# Patient Record
Sex: FEMALE | Race: AMERICAN INDIAN OR ALASKA NATIVE | NOT HISPANIC OR LATINO | Employment: FULL TIME | URBAN - METROPOLITAN AREA
[De-identification: names, ages, dates, MRNs, and addresses within clinical notes are randomized per-mention and may not be internally consistent; named-entity substitution may affect disease eponyms.]

---

## 2017-05-29 ENCOUNTER — OFFICE VISIT (OUTPATIENT)
Dept: INTERNAL MEDICINE | Facility: CLINIC | Age: 47
End: 2017-05-29
Payer: COMMERCIAL

## 2017-05-29 ENCOUNTER — HOSPITAL ENCOUNTER (OUTPATIENT)
Dept: RADIOLOGY | Facility: HOSPITAL | Age: 47
Discharge: HOME | End: 2017-05-29
Attending: INTERNAL MEDICINE
Payer: COMMERCIAL

## 2017-05-29 VITALS
SYSTOLIC BLOOD PRESSURE: 131 MMHG | WEIGHT: 184.06 LBS | BODY MASS INDEX: 31.42 KG/M2 | DIASTOLIC BLOOD PRESSURE: 85 MMHG | HEART RATE: 101 BPM | HEIGHT: 64 IN | RESPIRATION RATE: 17 BRPM | TEMPERATURE: 98 F

## 2017-05-29 DIAGNOSIS — J20.9 ACUTE BRONCHITIS, UNSPECIFIED ORGANISM: ICD-10-CM

## 2017-05-29 DIAGNOSIS — J20.9 ACUTE BRONCHITIS, UNSPECIFIED ORGANISM: Primary | ICD-10-CM

## 2017-05-29 DIAGNOSIS — I10 BENIGN ESSENTIAL HYPERTENSION: ICD-10-CM

## 2017-05-29 PROCEDURE — 71020 XR CHEST PA AND LATERAL: CPT | Mod: 26,,, | Performed by: RADIOLOGY

## 2017-05-29 PROCEDURE — 99999 PR PBB SHADOW E&M-NEW PATIENT-LVL IV: CPT | Mod: PBBFAC,,, | Performed by: INTERNAL MEDICINE

## 2017-05-29 PROCEDURE — 71020 XR CHEST PA AND LATERAL: CPT | Mod: TC

## 2017-05-29 PROCEDURE — 99203 OFFICE O/P NEW LOW 30 MIN: CPT | Mod: S$GLB,,, | Performed by: INTERNAL MEDICINE

## 2017-05-29 RX ORDER — AZELASTINE 1 MG/ML
SPRAY, METERED NASAL
Refills: 5 | COMMUNITY
Start: 2017-04-13

## 2017-05-29 RX ORDER — METOPROLOL SUCCINATE 25 MG/1
12.5 TABLET, EXTENDED RELEASE ORAL 2 TIMES DAILY
Refills: 8 | COMMUNITY
Start: 2017-04-04 | End: 2017-05-29

## 2017-05-29 RX ORDER — DROSPIRENONE AND ETHINYL ESTRADIOL 0.02-3(28)
1 KIT ORAL DAILY
Refills: 11 | COMMUNITY
Start: 2017-04-28

## 2017-05-29 RX ORDER — AZITHROMYCIN 250 MG/1
TABLET, FILM COATED ORAL
COMMUNITY
Start: 2017-05-25

## 2017-05-29 RX ORDER — ALBUTEROL SULFATE 90 UG/1
1-2 AEROSOL, METERED RESPIRATORY (INHALATION) EVERY 6 HOURS PRN
Qty: 18 G | Refills: 0 | Status: SHIPPED | OUTPATIENT
Start: 2017-05-29 | End: 2018-05-29

## 2017-05-29 RX ORDER — ATORVASTATIN CALCIUM 40 MG/1
40 TABLET, FILM COATED ORAL DAILY
Refills: 0 | COMMUNITY
Start: 2017-05-02

## 2017-05-29 RX ORDER — ACETAMINOPHEN 500 MG
500 TABLET ORAL EVERY 6 HOURS PRN
COMMUNITY

## 2017-05-29 RX ORDER — BENZONATATE 100 MG/1
100 CAPSULE ORAL 3 TIMES DAILY PRN
Qty: 30 CAPSULE | Refills: 0 | Status: SHIPPED | OUTPATIENT
Start: 2017-05-29 | End: 2017-06-08

## 2017-05-29 RX ORDER — FLUTICASONE PROPIONATE 50 MCG
SPRAY, SUSPENSION (ML) NASAL
Refills: 5 | COMMUNITY
Start: 2017-04-13

## 2017-05-29 RX ORDER — PROMETHAZINE HYDROCHLORIDE AND DEXTROMETHORPHAN HYDROBROMIDE 6.25; 15 MG/5ML; MG/5ML
5 SYRUP ORAL NIGHTLY PRN
Qty: 180 ML | Refills: 0 | Status: SHIPPED | OUTPATIENT
Start: 2017-05-29 | End: 2017-06-08

## 2017-05-29 RX ORDER — METOPROLOL TARTRATE 25 MG/1
12.5 TABLET, FILM COATED ORAL 2 TIMES DAILY
Qty: 30 TABLET | Refills: 1 | Status: SHIPPED | OUTPATIENT
Start: 2017-05-29 | End: 2018-05-29

## 2017-05-29 RX ORDER — PROMETHAZINE HYDROCHLORIDE AND DEXTROMETHORPHAN HYDROBROMIDE 6.25; 15 MG/5ML; MG/5ML
5 SYRUP ORAL NIGHTLY PRN
Qty: 180 ML | Refills: 0 | Status: SHIPPED | OUTPATIENT
Start: 2017-05-29 | End: 2017-05-29 | Stop reason: SDUPTHER

## 2017-05-29 RX ORDER — GUAIFENESIN 600 MG/1
1200 TABLET, EXTENDED RELEASE ORAL 2 TIMES DAILY
COMMUNITY

## 2017-05-29 NOTE — PROGRESS NOTES
"Subjective:       Patient ID: Arabella Jaimes is a 47 y.o. female.    Chief Complaint: URI; Cough; and Sore Throat    HPI urgent, Dr. Whyte    Reports chronic allergies. In the past 3 weeks, worsening symptoms. 5 days ago, had chills, body aches, sore throat, subjective fevers. Took tylenol, which helped a little bit. Saw outside MD (Dr. Eliseo Villalta) the next day. Given steroid injection(?) and Z pack. Felt worse (cough and chest tightness) after the visit. More painful sore throat. Productive cough w/ green sputum. Difficulty swallowing - felt like her throat feels "swollen"; feels like food is stuck in the throat when she swallows but no regurgitation of food and no actually stuck in the throat. Similar to her previous strep throat. Also some pain behind the R ear (has this w/ regular allergies). C/o chest tightness - feels like she can't breathe well; however no WEINSTEIN when walking. No rhinorrhea/congestion (not worse than baseline). No wheezing.     Taking tylenol prn for sore throat (from 6 of 10 to 4-5 of 10 for sore throat).   Takes zyrtec D BID - had to stop due to elevated BP. Currently on zyrtec (regular). Always takes flonase and astelin from her allergist.     Diarrhea x 1 ep today.    Works in school admin. No one in immediate family is sick.     HTN - on metoprolol XL 12.5mg daily.     Review of Systems  as above in HPI.     Objective:      Physical Exam    /85   Pulse 101   Temp 98.1 °F (36.7 °C) (Oral)   Resp 17   Ht 5' 4" (1.626 m)   Wt 83.5 kg (184 lb 1.4 oz)   LMP 05/08/2017   BMI 31.60 kg/m²     Gen - A+OX4, NAD  HEENT - PERRL, OP mild erythema but not edema. TM normal.   Neck - no LAD  CV - RRR, no m/r  Chest - CTAB, no wheezing/rhonchi/crackles. Normal work of breathing.  Abd - S/NT/ND/+BS  Ext -2 + B DP and radial pulses. No LE edema.     Assessment/Plan     Arabella was seen today for uri, cough and sore throat.    Diagnoses and all orders for this visit:    Acute bronchitis, " unspecified organism  -     X-Ray Chest PA And Lateral; Future  -     albuterol 90 mcg/actuation inhaler; Inhale 1-2 puffs into the lungs every 6 (six) hours as needed for Wheezing or Shortness of Breath. Rescue  -     benzonatate (TESSALON) 100 MG capsule; Take 1 capsule (100 mg total) by mouth 3 (three) times daily as needed.  -     promethazine-dextromethorphan (PROMETHAZINE-DM) 6.25-15 mg/5 mL Syrp; Take 5 mLs by mouth nightly as needed.    Benign essential hypertension - pt is on toprol XL 1/2 tab bid. Discussed that Toprol XL is an extended release medicine and cannot be cut. Will change to lopressor 25mg 1/2 tab. Pt to let her PCP know.  -     metoprolol tartrate (LOPRESSOR) 25 MG tablet; Take 0.5 tablets (12.5 mg total) by mouth 2 (two) times daily.    Return if symptoms worsen or fail to improve.      Rosey Lobo MD  Department of Internal Medicine - [facility]  2:23 PM    Pharmacy reports that pharmacy does not have the rx for promethazine-DM. Try OTC cough medicines - robitussin or delsym DM.     Rosey Lobo MD  Department of Internal Medicine - [facility]  3:18 PM

## 2017-08-02 DIAGNOSIS — I10 BENIGN ESSENTIAL HYPERTENSION: ICD-10-CM

## 2017-08-02 RX ORDER — METOPROLOL TARTRATE 25 MG/1
12.5 TABLET, FILM COATED ORAL 2 TIMES DAILY
Qty: 30 TABLET | Refills: 1 | OUTPATIENT
Start: 2017-08-02 | End: 2018-08-02

## 2017-08-26 DIAGNOSIS — I10 BENIGN ESSENTIAL HYPERTENSION: ICD-10-CM

## 2017-08-26 RX ORDER — METOPROLOL TARTRATE 25 MG/1
12.5 TABLET, FILM COATED ORAL 2 TIMES DAILY
Qty: 30 TABLET | Refills: 1 | OUTPATIENT
Start: 2017-08-26 | End: 2018-08-26

## 2020-05-11 ENCOUNTER — LAB VISIT (OUTPATIENT)
Dept: PRIMARY CARE CLINIC | Facility: CLINIC | Age: 50
End: 2020-05-11
Payer: COMMERCIAL

## 2020-05-11 DIAGNOSIS — Z00.6 RESEARCH STUDY PATIENT: Primary | ICD-10-CM

## 2020-05-11 LAB — SARS-COV-2 IGG SERPLBLD QL IA.RAPID: NEGATIVE

## 2020-05-11 PROCEDURE — 86769 SARS-COV-2 COVID-19 ANTIBODY: CPT

## 2020-05-11 PROCEDURE — U0002 COVID-19 LAB TEST NON-CDC: HCPCS

## 2020-05-11 NOTE — RESEARCH
Date of Consent: [id number]    Sponsor: [facility]    Study Title/IRB Number: Observational study of Sars-CoV2 Immunoglobulin G (IgG) seroprevalence among the [facility] population over time 2020.163  Principle Investigator: Nicole Pereyra, PhD    Did the patient need translation services? No   name: N/A    Prior to the Informed Consent (IC) being signed, or any study protocol required data collection, testing, procedure, or intervention being performed, the following was done and/or discussed:   Patient was given a paper copy of the IC for review    Patient was given study FAQ   Purpose of the study and qualifications to participate    Study design and tests or procedures done at this visit   Confidentiality and HIPAA Authorization for Release of Medical Records for the research trial/ subject's rights/research related injury   Risk, Benefits, Alternative Treatments, Compensation and Costs   Participation in the research trial is voluntary and patient may withdraw at anytime   Contact information for study related questions    Patient verbalizes understanding of the above: Yes  Contact information for PI and IRB given to patient: Yes  Patient able to adequately summarize: the purpose of the study, the risks associated with the study, and all procedures, testing, and follow-ups associated with the study: Yes    The consent was discussed verbally with the patient and all questions were answered satisfactorily. Patient gave verbal consent for the Seroprevalence research study with an IRB approval date of 05/08/2020.      The Consent, Consent Witness and name of Clinical Research Coordinator consenting was captured and documented in [employer]Cap.    All Inclusion and Exclusion Criteria reviewed, subject meets all Inclusion criteria and does not meet any Exclusion Criteria at this time.     Patient Eligibility was confirmed.    Patient responded to survey questions.    The following biospecimen  collection procedures were collected:    -Nasopharyngeal Swab Collection  -Blood collection

## 2020-05-12 LAB — SARS-COV-2 RNA RESP QL NAA+PROBE: NOT DETECTED

## 2021-04-16 ENCOUNTER — PATIENT MESSAGE (OUTPATIENT)
Dept: RESEARCH | Facility: HOSPITAL | Age: 51
End: 2021-04-16

## 2023-12-01 ENCOUNTER — TELEPHONE (OUTPATIENT)
Dept: SURGERY | Facility: CLINIC | Age: 53
End: 2023-12-01
Payer: COMMERCIAL

## 2023-12-01 NOTE — TELEPHONE ENCOUNTER
Spoke with pt regarding referral request. Pt request was sent to Dr. Cole but patient is to be seen by Dr. Rodríguez per referral from Dr. Marcelo Yao at [facility] for inflammation and autoimmune issues. Message sent to staff to schedule patient. Pt denies further questions at this time.       ----- Message from Maribeth Limon sent at 12/1/2023  9:04 AM CST -----  Regarding: pt adivce  Contact: [phone]  Pt calling in regards to scheduling with provider as a new pt from a referral pt needing a call back.

## 2024-05-29 ENCOUNTER — HOSPITAL ENCOUNTER (OUTPATIENT)
Dept: RADIOLOGY | Facility: HOSPITAL | Age: 54
Discharge: HOME | End: 2024-05-29
Attending: INTERNAL MEDICINE
Payer: COMMERCIAL

## 2024-05-29 ENCOUNTER — OFFICE VISIT (OUTPATIENT)
Dept: RHEUMATOLOGY | Facility: CLINIC | Age: 54
End: 2024-05-29
Payer: COMMERCIAL

## 2024-05-29 VITALS
BODY MASS INDEX: 32.28 KG/M2 | SYSTOLIC BLOOD PRESSURE: 148 MMHG | HEART RATE: 74 BPM | DIASTOLIC BLOOD PRESSURE: 89 MMHG | WEIGHT: 188.06 LBS

## 2024-05-29 DIAGNOSIS — M25.50 POLYARTHRALGIA: ICD-10-CM

## 2024-05-29 DIAGNOSIS — M72.2 PLANTAR FASCIITIS OF LEFT FOOT: Primary | ICD-10-CM

## 2024-05-29 PROCEDURE — 1159F MED LIST DOCD IN RCRD: CPT | Mod: CPTII,S$GLB,, | Performed by: INTERNAL MEDICINE

## 2024-05-29 PROCEDURE — 73110 X-RAY EXAM OF WRIST: CPT | Mod: 26,50,, | Performed by: RADIOLOGY

## 2024-05-29 PROCEDURE — 73610 X-RAY EXAM OF ANKLE: CPT | Mod: 26,50,, | Performed by: RADIOLOGY

## 2024-05-29 PROCEDURE — 73630 X-RAY EXAM OF FOOT: CPT | Mod: TC,50

## 2024-05-29 PROCEDURE — 3079F DIAST BP 80-89 MM HG: CPT | Mod: CPTII,S$GLB,, | Performed by: INTERNAL MEDICINE

## 2024-05-29 PROCEDURE — 99999 PR PBB SHADOW E&M-EST. PATIENT-LVL V: CPT | Mod: PBBFAC,,, | Performed by: INTERNAL MEDICINE

## 2024-05-29 PROCEDURE — 1160F RVW MEDS BY RX/DR IN RCRD: CPT | Mod: CPTII,S$GLB,, | Performed by: INTERNAL MEDICINE

## 2024-05-29 PROCEDURE — 3077F SYST BP >= 140 MM HG: CPT | Mod: CPTII,S$GLB,, | Performed by: INTERNAL MEDICINE

## 2024-05-29 PROCEDURE — 73110 X-RAY EXAM OF WRIST: CPT | Mod: TC,50

## 2024-05-29 PROCEDURE — 73130 X-RAY EXAM OF HAND: CPT | Mod: TC,50

## 2024-05-29 PROCEDURE — 3008F BODY MASS INDEX DOCD: CPT | Mod: CPTII,S$GLB,, | Performed by: INTERNAL MEDICINE

## 2024-05-29 PROCEDURE — 99205 OFFICE O/P NEW HI 60 MIN: CPT | Mod: S$GLB,,, | Performed by: INTERNAL MEDICINE

## 2024-05-29 PROCEDURE — 73130 X-RAY EXAM OF HAND: CPT | Mod: 26,50,, | Performed by: RADIOLOGY

## 2024-05-29 PROCEDURE — 73630 X-RAY EXAM OF FOOT: CPT | Mod: 26,50,, | Performed by: RADIOLOGY

## 2024-05-29 PROCEDURE — 73610 X-RAY EXAM OF ANKLE: CPT | Mod: TC,50

## 2024-05-29 RX ORDER — DICLOFENAC SODIUM 10 MG/G
2 GEL TOPICAL 2 TIMES DAILY PRN
Qty: 100 G | Refills: 5 | Status: SHIPPED | OUTPATIENT
Start: 2024-05-29

## 2024-05-29 NOTE — PROGRESS NOTES
Chief Complaint   Patient presents with    Disease Management    Joint Pain       Patient was referred by      History of presenting illness    54 year old female with polyarticular joint pain for evaluation    Past history  Hypertension  HLD    Family history  Dad has arthritis- crooked and crackly arthritis with pain  Mom has alzheimers,arthritis  Sister- lymphoma  Brother- gout    Social history  Not a smoker,alcoholic        Review of Systems        No telangiectasias   No calcinosis   No psoriasis     No patchy alopecia   No oral and nasal ulcers   No dry eyes and dry mouth   No pleurisy or any cardiopulmonary complaints   No dysphagia,diplopia and dysphonia and muscle weakness   No n/v/d/c   No acid reflux+   No raynaud's+   No digital ulcers   No cytopenias   No renal issues   No blood clots   No fever,chills,weight loss and loss of appetite   No pregnancy losses/pre term deliveries /pregnancy complications   No recurrent conjunctivitis or uveitis or scleritis or episcleritis   No chronic or bloody diarrhea with no u colitis or crohn's /inflammatory bowel disease   No vaginal or urethral  d/c/STDs/no ulcers   No unexplained lymphadenopathy,parotitis   No seizures,strokes,psychosis  No sclerodactyly  No puffy hands  No perioral tightness           Physical Exam   Constitutional: She is oriented to person, place, and time. No distress.   HENT:   Head: Normocephalic.   Mouth/Throat: Oropharynx is clear and moist.   Eyes: Pupils are equal, round, and reactive to light. Conjunctivae are normal. Right eye exhibits no discharge. Left eye exhibits no discharge. No scleral icterus.   Neck: No thyromegaly present.   Cardiovascular: Normal rate, regular rhythm and normal heart sounds.   Pulmonary/Chest: Effort normal and breath sounds normal. No stridor.   Abdominal: Soft. Bowel sounds are normal.   Musculoskeletal:         General: Normal range of motion.      Cervical back: Normal range of motion.    Lymphadenopathy:     She has no cervical adenopathy.   Neurological: She is alert and oriented to person, place, and time.   Skin: Skin is warm. No rash noted. She is not diaphoretic.   Psychiatric: Affect and judgment normal.         Assessment     54 year old white female comes in with joint pains for 10 months    She has     1 ) One episode of b/l feet pain  No triggers, but she walked 5 miles a day, she also had changed shoes and she felt like this might have triggered it  No A/R factors  On top of the feet  Swelling +  No stiffness   She had tingling and numbness in the feet  This was 10 months ago  She stopped exercising   It lasted for 4 months and then it resolved by easing back on walking and exercising    Xrays normal  Blood work ?? Arthritis  MIGUEL neg  Uric acid nml  CMP nml  Vit d nml    Inflammation was ? High hence see rheumatology- this was placed 10 months ago  Medication to alleviate pain was offered    This has not returned  She has continued to be on the lighter exercise regimen    2) new onset pain in the back of the left heel- not necessarily in the achilles- this started few months ago  When she gets up, it is hard to walk  First few steps in the morning- painful- then gets better and then by the end of the day it can get worse again  Easier to walk during the day  But pain persists throughout the day    Light aches in the ankles,comes and goes,no known triggers    2 ) Pain in the b/l hands at the [facility] only  CMC injections at ortho -in the past and braces helped    No pain,swelling in the MCPs,PIPs,DIPs    Wrists hurt- not from activity   No swelling  Tingling and numbness     Remotely had wrist pain only during pregnancy,last trimester,possibly due to CTS     3) During menopause she had pain in the hips     4) tingling and numbness face and mouth area 10 months ago  This has not recurred      Associated symptoms  Rash behind the ear- could be due to allergies ??   Rash on the palms of the  hands  She was never evaluated by dermatology     Scratch test positive to dogs  H/o eczema on knees    Heat and sun exposure can cause rash on the thighs and on the face     Blurry vision when tired or in the mornings   Eye exam nml  Needs reader glasses    Migraines all her life,with blind spots and floaters needing ibuprofen and avoiding lights and needing rest    Night sweats-menopausal    On exam today  0 TJ,SJ  Other than active plantar fasciitis none of the exam findings are active currently    Will work up for rheumatoid arthritis    1. Plantar fasciitis of left foot    2. Polyarthralgia        Reviewed labs/xrays  Reviewed medications    Ordered labs /xrays      New problem     Plan    RF,CCP,ESR,CRP    Hand xrays  Foot xrays  Wrist xrays  Ankle xrays    Obtain records from the foot pain flare episode    If all labs and xrays are fine- nothing to do  Wait for another flare to happen  Examine and diagnose    Physical therapy for plantar fasciitis  Home PT  Topical voltaren gel    Rtc 3 months     More than 60 minutes spent taking care of the patient      Arabella was seen today for disease management and joint pain.    Diagnoses and all orders for this visit:    Plantar fasciitis of left foot  -     Ambulatory referral/consult to Physical/Occupational Therapy; Future    Polyarthralgia  -     Rheumatoid Factor; Future  -     Cyclic Citrullinated Peptide Antibody, IgG; Future  -     Sedimentation rate; Future  -     C-Reactive Protein; Future  -     X-Ray Hand 3 View Bilateral; Future  -     X-Ray Wrist Complete Bilateral; Future  -     X-Ray Foot Complete Bilateral; Future  -     X-Ray Ankle Complete Bilateral; Future  -     HLA B27 Antigen; Future    Other orders  -     diclofenac sodium (VOLTAREN) 1 % Gel; Apply 2 g topically 2 (two) times daily as needed.        Medication changes made  Refilled medications  Toxicity issues discussed    Old records reviewed and summarised  Records are from       Follow up in      Notes will be sent to PCP    Preventive medicine

## 2024-06-10 ENCOUNTER — PATIENT MESSAGE (OUTPATIENT)
Dept: RHEUMATOLOGY | Facility: CLINIC | Age: 54
End: 2024-06-10
Payer: COMMERCIAL

## 2024-06-12 ENCOUNTER — CLINICAL SUPPORT (OUTPATIENT)
Dept: REHABILITATION | Facility: HOSPITAL | Age: 54
End: 2024-06-12
Payer: COMMERCIAL

## 2024-06-12 DIAGNOSIS — M72.2 PLANTAR FASCIITIS OF LEFT FOOT: ICD-10-CM

## 2024-06-12 PROCEDURE — 97110 THERAPEUTIC EXERCISES: CPT

## 2024-06-12 PROCEDURE — 97161 PT EVAL LOW COMPLEX 20 MIN: CPT

## 2024-06-24 ENCOUNTER — CLINICAL SUPPORT (OUTPATIENT)
Dept: REHABILITATION | Facility: HOSPITAL | Age: 54
End: 2024-06-24
Payer: COMMERCIAL

## 2024-06-24 DIAGNOSIS — M72.2 PLANTAR FASCIITIS OF LEFT FOOT: Primary | ICD-10-CM

## 2024-06-24 PROCEDURE — 97140 MANUAL THERAPY 1/> REGIONS: CPT | Mod: CQ

## 2024-06-24 PROCEDURE — 97110 THERAPEUTIC EXERCISES: CPT | Mod: CQ

## 2024-06-24 NOTE — PROGRESS NOTES
[facility]   Physical Therapy Treatment Note      Name: Arabella Jaimes  Clinic Number: [id number]    Therapy Diagnosis:   Encounter Diagnosis   Name Primary?    Plantar fasciitis of left foot Yes     Physician: Timur Rodríguez*    Visit Date: 6/24/2024    Physician Orders: PT Eval and Treat   Medical Diagnosis from Referral: M72.2 (ICD-10-CM) - Plantar fasciitis of left foot   Evaluation Date: 6/12/2024  Authorization Period Expiration: 12/31/2024  Plan of Care Expiration: 9/12/2024  Progress Note Due: 7/12/2024  Visit # / Visits authorized: 1/1; 1/20  FOTO: 1/3     Precautions: Standard, HTN     PTA Visit #: 1/5     Time In: 131 PM   Time Out: 230 PM   Total Billable Time: 59 minutes    Subjective     Pt reports: some ankle discomfort throughout left ankle upon entering Physical Therapy treatment today.  She was compliant with home exercise program.  Response to previous treatment: initial eval   Functional change: none     Pain: 4/10  Location: left foot pain     Objective      Objective Measures updated at progress report unless specified.     Treatment     Arabella received the treatments listed below:      therapeutic exercises to develop strength and ROM for 45 minutes including:    Plantar fascia stretch 5 x 30 second hold   Gastroc stretch with towel 5 x 30 second hold  Seated heel raises 4 lbs 2 x 10   Seated heel raises with lacrosse ball  Seated short foot 3 x 10   Towel curls 2 minutes   Toe yoga 2 minutes   Standing heel raises with lacrosse ball 2 x 10   Standing short foot 2 minutes  Left single leg standing with big toe flexion with red thera band 2 x 10 marching with right lower extremity     manual therapy techniques: Soft tissue Mobilization were applied to the: left foot for 10 minutes, including:    Manual  gastroc stretch   Soft Tissue Massage left plantar fascia     Patient Education and Home Exercises       Education provided:   - Anatomy and exam objective  findings  - PT role and POC  - HEP     Written Home Exercises Provided: yes. Exercises were reviewed and Arabella was able to demonstrate them prior to the end of the session.  Arabella demonstrated good  understanding of the education provided. See EMR under Patient Instructions for exercises provided during therapy sessions.    Assessment     Patient was able to complete therapeutic exercise without reproduced pain, however, patient with muscle fatigue and soreness throughout session. Patient with some tightness throughout Soft Tissue Massage to plantar fascia, however, patient able to tolerate full treatment. Physical Therapist Assistant discussed with patient her performing stretches and rolling frozen water bottle under foot to help decrease tightness and discomfort throughout foot. Physical Therapist verbally understood and agreed.     Arabella Is progressing well towards her goals.   Pt prognosis is Good.     Pt will continue to benefit from skilled outpatient physical therapy to address the deficits listed in the problem list box on initial evaluation, provide pt/family education and to maximize pt's level of independence in the home and community environment.     Pt's spiritual, cultural and educational needs considered and pt agreeable to plan of care and goals.     Anticipated barriers to physical therapy: chronicity of pain     Goals:     SHORT TERM GOALS:  4 weeks     Patient will be independent with HEP to supplement therapy in return to maximal function.     Pain rating at Worst: 5/10 or less for improved tolerance with prolonged walking.      Patient will demonstrate Left ankle DF AROM at least 10 degrees.         LONG TERM GOALS: 8 weeks     Patient will demonstrate full Left ankle AROM in all directions.     Patient will demonstrate bilateral LE strength 5/5.      Pain Rating at Worst: 2/10 or less to improve overall Quality of Life.      Patient will meet predicted functional outcome (FOTO) score: 60%  functional ability or greater to increase self-perceived functional ability.     Patient will be able to work out without increased foot symptoms. (Patient goal)     Patient will be independent with updated HEP at discharge for self-management of symptoms.        Plan     Continue with Physical Therapist Plan of Care.     PT/PTA met face to face to discuss pt's treatment plan and progress towards established goals. Pt will be seen by a physical therapist minimally every 6th visit or every 30 days.    Devon Heard PTA

## 2024-06-26 NOTE — PROGRESS NOTES
[facility]   Physical Therapy Treatment Note      Name: Arabella Jaimes  Clinic Number: [id number]    Therapy Diagnosis:   Encounter Diagnosis   Name Primary?    Plantar fasciitis of left foot Yes     Physician: Timur Rodríguez*    Visit Date: 6/27/2024    Physician Orders: PT Eval and Treat   Medical Diagnosis from Referral: M72.2 (ICD-10-CM) - Plantar fasciitis of left foot   Evaluation Date: 6/12/2024  Authorization Period Expiration: 12/31/2024  Plan of Care Expiration: 9/12/2024  Progress Note Due: 7/12/2024  Visit # / Visits authorized: 1/1; 2/20  FOTO: 1/3     Precautions: Standard, HTN     PTA Visit #: 2/5     Time In: 210 PM   Time Out: 300 PM   Total Billable Time: 50 minutes    Subjective     Pt reports: continued discomfort throughout foot upon entering Physical Therapy treatment today.     She was compliant with home exercise program.  Response to previous treatment: initial eval   Functional change: none     Pain: 4/10  Location: left foot pain     Objective      Objective Measures updated at progress report unless specified.     Treatment     Arabella received the treatments listed below:      therapeutic exercises to develop strength and ROM for 45 minutes including:    Plantar fascia stretch 5 x 30 second hold   Gastroc stretch with towel 5 x 30 second hold  Seated heel raises 4 lbs 2 x 10   Seated heel raises with lacrosse ball  Seated short foot 3 x 10   Towel curls 2 minutes   Toe yoga 2 minutes   Standing heel raises with lacrosse ball 2 x 10   Standing short foot 2 minutes  Left single leg standing with big toe flexion with red thera band 2 x 10 marching with right lower extremity NP     manual therapy techniques: Soft tissue Mobilization were applied to the: left foot for 10 minutes, including:    Manual  gastroc stretch   Soft Tissue Massage left plantar fascia     Patient Education and Home Exercises       Education provided:   - Anatomy and exam objective  findings  - PT role and POC  - HEP     Written Home Exercises Provided: yes. Exercises were reviewed and Arabella was able to demonstrate them prior to the end of the session.  Arabella demonstrated good  understanding of the education provided. See EMR under Patient Instructions for exercises provided during therapy sessions.    Assessment     Patient again able to complete therapeutic exercise without reports of increased or reproduced pain throughout or after Physical Therapy treatment today. Patient still with some tightness throughout manual gastroc stretching and Soft Tissue Massage to plantar fascia.     Arabella Is progressing well towards her goals.   Pt prognosis is Good.     Pt will continue to benefit from skilled outpatient physical therapy to address the deficits listed in the problem list box on initial evaluation, provide pt/family education and to maximize pt's level of independence in the home and community environment.     Pt's spiritual, cultural and educational needs considered and pt agreeable to plan of care and goals.     Anticipated barriers to physical therapy: chronicity of pain     Goals:     SHORT TERM GOALS:  4 weeks     Patient will be independent with HEP to supplement therapy in return to maximal function.     Pain rating at Worst: 5/10 or less for improved tolerance with prolonged walking.      Patient will demonstrate Left ankle DF AROM at least 10 degrees.         LONG TERM GOALS: 8 weeks     Patient will demonstrate full Left ankle AROM in all directions.     Patient will demonstrate bilateral LE strength 5/5.      Pain Rating at Worst: 2/10 or less to improve overall Quality of Life.      Patient will meet predicted functional outcome (FOTO) score: 60% functional ability or greater to increase self-perceived functional ability.     Patient will be able to work out without increased foot symptoms. (Patient goal)     Patient will be independent with updated HEP at discharge for  self-management of symptoms.          Plan     Continue with Physical Therapist Plan of Care.     PT/PTA met face to face to discuss pt's treatment plan and progress towards established goals. Pt will be seen by a physical therapist minimally every 6th visit or every 30 days.    Devon Heard PTA

## 2024-06-27 ENCOUNTER — CLINICAL SUPPORT (OUTPATIENT)
Dept: REHABILITATION | Facility: HOSPITAL | Age: 54
End: 2024-06-27
Payer: COMMERCIAL

## 2024-06-27 DIAGNOSIS — M72.2 PLANTAR FASCIITIS OF LEFT FOOT: Primary | ICD-10-CM

## 2024-06-27 PROCEDURE — 97140 MANUAL THERAPY 1/> REGIONS: CPT | Mod: CQ

## 2024-06-27 PROCEDURE — 97110 THERAPEUTIC EXERCISES: CPT | Mod: CQ

## 2024-07-03 ENCOUNTER — CLINICAL SUPPORT (OUTPATIENT)
Dept: REHABILITATION | Facility: HOSPITAL | Age: 54
End: 2024-07-03
Payer: COMMERCIAL

## 2024-07-03 DIAGNOSIS — M72.2 PLANTAR FASCIITIS OF LEFT FOOT: Primary | ICD-10-CM

## 2024-07-03 PROCEDURE — 97112 NEUROMUSCULAR REEDUCATION: CPT

## 2024-07-03 PROCEDURE — 97110 THERAPEUTIC EXERCISES: CPT

## 2024-07-03 PROCEDURE — 97140 MANUAL THERAPY 1/> REGIONS: CPT

## 2024-07-03 NOTE — PROGRESS NOTES
[facility]   Physical Therapy Treatment Note      Name: Arabella Jaiems  Clinic Number: [id number]    Therapy Diagnosis:   Encounter Diagnosis   Name Primary?    Plantar fasciitis of left foot Yes     Physician: Timur Rodríguez*    Visit Date: 7/3/2024    Physician Orders: PT Eval and Treat   Medical Diagnosis from Referral: M72.2 (ICD-10-CM) - Plantar fasciitis of left foot   Evaluation Date: 6/12/2024  Authorization Period Expiration: 12/31/2024  Plan of Care Expiration: 9/12/2024  Progress Note Due: 7/12/2024  Visit # / Visits authorized: 1/1; 3/20  FOTO: 1/3     Precautions: Standard, HTN     PTA Visit #: 2/5     Time In: 210 PM   Time Out: 300 PM   Total Billable Time: 50 minutes    Subjective     Pt reports: continued complaints of left foot pain. Will be transitioning to [facility] for Physical Therapy.    She was compliant with home exercise program.  Response to previous treatment: initial eval   Functional change: none     Pain: 4/10  Location: left foot pain     Objective      Objective Measures updated at progress report unless specified.     Treatment     Arabella received the treatments listed below:      therapeutic exercises to develop strength and ROM for 30 minutes including:    Standing runners stretch 5x15 sec  Standing soleus stretch 5x15 sec  Seated heel raise 10lb dumbbell 2x10  Split stance heel raise with 10lb dumbbell in right hand 2x8  Standing heel raise 3x8    manual therapy techniques: Soft tissue Mobilization were applied to the: left foot for 10 minutes, including:    Midfoot 1st Ray mobilization   Soft Tissue Massage left plantar fascia     Arabella participated in neuromuscular re-education activities to improve: Balance, Coordination, Kinesthetic, Sense, and Proprioception for 10 minutes. The following activities were included:    Toe yoga 4 minutes  Standing hip abduction red thera band 3x10 both sides (cue for 1st MTP pressure)  Seated short foot  4 minutes    Patient Education and Home Exercises       Education provided:   - Anatomy and exam objective findings  - PT role and POC  - HEP     Written Home Exercises Provided: yes. Exercises were reviewed and Arabella was able to demonstrate them prior to the end of the session.  Arabella demonstrated good  understanding of the education provided. See EMR under Patient Instructions for exercises provided during therapy sessions.    Assessment     Patient continues to progress well with Physical Therapy and able to complete all relevant mvmts today with progressions with no reproduction of symptoms. Patient continues to be challenged with loading thru 1st ray and foot intrinsics. Plan to continue to progress as tolerated.    Arabella Is progressing well towards her goals.   Pt prognosis is Good.     Pt will continue to benefit from skilled outpatient physical therapy to address the deficits listed in the problem list box on initial evaluation, provide pt/family education and to maximize pt's level of independence in the home and community environment.     Pt's spiritual, cultural and educational needs considered and pt agreeable to plan of care and goals.     Anticipated barriers to physical therapy: chronicity of pain     Goals:     SHORT TERM GOALS:  4 weeks     Patient will be independent with HEP to supplement therapy in return to maximal function.     Pain rating at Worst: 5/10 or less for improved tolerance with prolonged walking.      Patient will demonstrate Left ankle DF AROM at least 10 degrees.         LONG TERM GOALS: 8 weeks     Patient will demonstrate full Left ankle AROM in all directions.     Patient will demonstrate bilateral LE strength 5/5.      Pain Rating at Worst: 2/10 or less to improve overall Quality of Life.      Patient will meet predicted functional outcome (FOTO) score: 60% functional ability or greater to increase self-perceived functional ability.     Patient will be able to work out without  increased foot symptoms. (Patient goal)     Patient will be independent with updated HEP at discharge for self-management of symptoms.          Plan     Continue with Physical Therapist Plan of Care.     Rico Amaya, PT

## 2024-07-08 ENCOUNTER — CLINICAL SUPPORT (OUTPATIENT)
Dept: REHABILITATION | Facility: HOSPITAL | Age: 54
End: 2024-07-08
Payer: COMMERCIAL

## 2024-07-08 DIAGNOSIS — M72.2 PLANTAR FASCIITIS OF LEFT FOOT: Primary | ICD-10-CM

## 2024-07-08 PROCEDURE — 97112 NEUROMUSCULAR REEDUCATION: CPT | Mod: PN

## 2024-07-08 PROCEDURE — 97140 MANUAL THERAPY 1/> REGIONS: CPT | Mod: PN

## 2024-07-08 PROCEDURE — 97110 THERAPEUTIC EXERCISES: CPT | Mod: PN

## 2024-07-08 NOTE — PROGRESS NOTES
[facility]   Physical Therapy Treatment Note      Name: Arabella Jaimes  Clinic Number: [id number]    Therapy Diagnosis:   Encounter Diagnosis   Name Primary?    Plantar fasciitis of left foot Yes       Physician: Timur Rodríguez*    Visit Date: 7/8/2024    Physician Orders: PT Eval and Treat   Medical Diagnosis from Referral: M72.2 (ICD-10-CM) - Plantar fasciitis of left foot   Evaluation Date: 6/12/2024  Authorization Period Expiration: 12/31/2024  Plan of Care Expiration: 9/12/2024  Progress Note Due: 7/12/2024  Visit # / Visits authorized: 1/1; 2/20  FOTO: 1/3     Precautions: Standard, HTN     PTA Visit #: 2/5     Time In: 12:55 pm  Time Out: 1:55 pm  Total Billable Time: 60 minutes    Subjective     Pt reports: she has pain in the heel about 4/10. She has L ankle pain about 7/10. She states she feels pain first step in the morning.     She was compliant with home exercise program.  Response to previous treatment: initial eval   Functional change: none     Pain: 7/10  Location: left foot pain     Objective      Objective Measures updated at progress report unless specified.     Treatment     Arabella received the treatments listed below:      therapeutic exercises to develop strength and ROM for 38  minutes including:    Long sitting calf stretch 5x 30 sec  Long sitting soleus stretch 5x30 sec  Long sitting plantar fascia stretch with towel 5x30 sec  Big toes stretch 5x30 sec  Standing calf stretch 5x30 sec   Stnading soleus stretch 5x30 sec     manual therapy techniques: Soft tissue Mobilization were applied to the: left foot for 10 minutes, including:    Ice massage L plantar fascia  Soft Tissue Massage left plantar fascia     Arabella participated in neuromuscular re-education activities to improve: Proprioception and muscle motor control for 12 minutes. The following activities were included:    Toe yoga RTB 3x10  Towel curls 2 min  FOOT gym green band 30x  Tandem stance on blue  foam 5x30 sec     Arabella participated in dynamic functional therapeutic activities to improve functional performance for 00  minutes, including:        Patient Education and Home Exercises       Education provided:   - Anatomy and exam objective findings  - PT role and POC  - HEP     Written Home Exercises Provided: yes. Exercises were reviewed and Arabella was able to demonstrate them prior to the end of the session.  Arabella demonstrated good  understanding of the education provided. See EMR under Patient Instructions for exercises provided during therapy sessions.    Assessment     Pt presented to therapy with aggravation of L plantar fascia pain and L lateral malleolus pain. PT session today was focusing in decrease L plantar fascia pain. Stretches techniques and muscles motor control of foot was performed. Pt required mod v/c's to perform exercises with proper form and proper muscles activation. STM and ice massage performed to L lateral ankle and L plantar fascia. Pain subsided in the end of PT session.       Arabella Is progressing well towards her goals.   Pt prognosis is Good.     Pt will continue to benefit from skilled outpatient physical therapy to address the deficits listed in the problem list box on initial evaluation, provide pt/family education and to maximize pt's level of independence in the home and community environment.     Pt's spiritual, cultural and educational needs considered and pt agreeable to plan of care and goals.     Anticipated barriers to physical therapy: chronicity of pain     Goals:     SHORT TERM GOALS:  4 weeks     Patient will be independent with HEP to supplement therapy in return to maximal function.     Pain rating at Worst: 5/10 or less for improved tolerance with prolonged walking.      Patient will demonstrate Left ankle DF AROM at least 10 degrees.         LONG TERM GOALS: 8 weeks     Patient will demonstrate full Left ankle AROM in all directions.     Patient will demonstrate  bilateral LE strength 5/5.      Pain Rating at Worst: 2/10 or less to improve overall Quality of Life.      Patient will meet predicted functional outcome (FOTO) score: 60% functional ability or greater to increase self-perceived functional ability.     Patient will be able to work out without increased foot symptoms. (Patient goal)     Patient will be independent with updated HEP at discharge for self-management of symptoms.          Plan     Continue with Physical Therapist Plan of Care.     Jaya Gutierrez, PT

## 2024-07-10 ENCOUNTER — CLINICAL SUPPORT (OUTPATIENT)
Dept: REHABILITATION | Facility: HOSPITAL | Age: 54
End: 2024-07-10
Payer: COMMERCIAL

## 2024-07-10 DIAGNOSIS — M72.2 PLANTAR FASCIITIS OF LEFT FOOT: Primary | ICD-10-CM

## 2024-07-10 PROCEDURE — 97112 NEUROMUSCULAR REEDUCATION: CPT | Mod: PN

## 2024-07-10 PROCEDURE — 97140 MANUAL THERAPY 1/> REGIONS: CPT | Mod: PN

## 2024-07-10 PROCEDURE — 97110 THERAPEUTIC EXERCISES: CPT | Mod: PN

## 2024-07-10 NOTE — PROGRESS NOTES
[facility]   Physical Therapy Treatment Note      Name: Arabella Jaimes  Clinic Number: [id number]    Therapy Diagnosis:   Encounter Diagnosis   Name Primary?    Plantar fasciitis of left foot Yes       Physician: Timur Rodríguez*    Visit Date: 7/10/2024    Physician Orders: PT Eval and Treat   Medical Diagnosis from Referral: M72.2 (ICD-10-CM) - Plantar fasciitis of left foot   Evaluation Date: 6/12/2024  Authorization Period Expiration: 12/31/2024  Plan of Care Expiration: 9/12/2024  Progress Note Due: 7/12/2024  Visit # / Visits authorized: 3/20  FOTO: 1/3     Precautions: Standard, HTN     PTA Visit #: 2/5     Time In: 12:55 pm  Time Out: 1:55 pm  Total Billable Time: 60 minutes    Subjective     Pt reports: she felt better after last PT session. She states pain came back after, but pain came back with less intensity.     She was compliant with home exercise program.  Response to previous treatment: initial eval   Functional change: none     Pain: 7/10  Location: left foot pain     Objective      Objective Measures updated at progress report unless specified.     Treatment     Arabella received the treatments listed below:      therapeutic exercises to develop strength and ROM for 38  minutes including:    Long sitting calf stretch 5x 30 sec  Long sitting soleus stretch 5x30 sec  Long sitting plantar fascia stretch with towel 5x30 sec  Big toes stretch 5x30 sec  Standing calf stretch 5x30 sec   Stnading soleus stretch 5x30 sec     manual therapy techniques: Soft tissue Mobilization were applied to the: left foot for 10 minutes, including:    Ice massage L plantar fascia  Soft Tissue Massage left plantar fascia     Arabella participated in neuromuscular re-education activities to improve: Proprioception and muscle motor control for 12 minutes. The following activities were included:    Toe yoga RTB 3x10  Towel curls 2 min  FOOT gym green band 30x  Tandem stance on blue foam 5x30 sec      Arabella participated in dynamic functional therapeutic activities to improve functional performance for 00  minutes, including:        Patient Education and Home Exercises       Education provided:   - Anatomy and exam objective findings  - PT role and POC  - HEP     Written Home Exercises Provided: yes. Exercises were reviewed and Arabella was able to demonstrate them prior to the end of the session.  Arabella demonstrated good  understanding of the education provided. See EMR under Patient Instructions for exercises provided during therapy sessions.    Assessment     Pt presented to therapy with less pain of L plantar fascia pain and L lateral malleolus.  PT session today was focusing in decrease L plantar fascia pain. Stretches techniques and muscles motor control of foot was performed. Pt required mod v/c's to perform exercises with proper form and proper muscles activation. STM and ice massage performed to L lateral ankle and L plantar fascia. Pain subsided in the end of PT session.       Arabella Is progressing well towards her goals.   Pt prognosis is Good.     Pt will continue to benefit from skilled outpatient physical therapy to address the deficits listed in the problem list box on initial evaluation, provide pt/family education and to maximize pt's level of independence in the home and community environment.     Pt's spiritual, cultural and educational needs considered and pt agreeable to plan of care and goals.     Anticipated barriers to physical therapy: chronicity of pain     Goals:     SHORT TERM GOALS:  4 weeks     Patient will be independent with HEP to supplement therapy in return to maximal function.     Pain rating at Worst: 5/10 or less for improved tolerance with prolonged walking.      Patient will demonstrate Left ankle DF AROM at least 10 degrees.         LONG TERM GOALS: 8 weeks     Patient will demonstrate full Left ankle AROM in all directions.     Patient will demonstrate bilateral LE  strength 5/5.      Pain Rating at Worst: 2/10 or less to improve overall Quality of Life.      Patient will meet predicted functional outcome (FOTO) score: 60% functional ability or greater to increase self-perceived functional ability.     Patient will be able to work out without increased foot symptoms. (Patient goal)     Patient will be independent with updated HEP at discharge for self-management of symptoms.          Plan     Continue with Physical Therapist Plan of Care.     Jaya Gutierrez PT

## 2024-07-15 ENCOUNTER — CLINICAL SUPPORT (OUTPATIENT)
Dept: REHABILITATION | Facility: HOSPITAL | Age: 54
End: 2024-07-15
Payer: COMMERCIAL

## 2024-07-15 DIAGNOSIS — M72.2 PLANTAR FASCIITIS OF LEFT FOOT: Primary | ICD-10-CM

## 2024-07-15 PROCEDURE — 97110 THERAPEUTIC EXERCISES: CPT | Mod: PN

## 2024-07-15 PROCEDURE — 97140 MANUAL THERAPY 1/> REGIONS: CPT | Mod: PN

## 2024-07-15 PROCEDURE — 97112 NEUROMUSCULAR REEDUCATION: CPT | Mod: PN

## 2024-07-15 NOTE — PROGRESS NOTES
[facility]   Physical Therapy Treatment Note      Name: Arabella Jaimes  Clinic Number: [id number]    Therapy Diagnosis:   Encounter Diagnosis   Name Primary?    Plantar fasciitis of left foot Yes       Physician: Timur Rodríguez*    Visit Date: 7/15/2024    Physician Orders: PT Eval and Treat   Medical Diagnosis from Referral: M72.2 (ICD-10-CM) - Plantar fasciitis of left foot   Evaluation Date: 6/12/2024  Authorization Period Expiration: 12/31/2024  Plan of Care Expiration: 9/12/2024  Progress Note Due: 7/12/2024  Visit # / Visits authorized: 3/20  FOTO: 1/3     Precautions: Standard, HTN     PTA Visit #: 2/5     Time In: 9;00 am  Time Out: 10:00 am  Total Billable Time: 60 minutes    Subjective     Pt reports: she states that pain increased after last visit. She had pain over the weekend. She states she feels better this morning.     She was compliant with home exercise program.  Response to previous treatment: initial eval   Functional change: none     Pain: 7/10  Location: left foot pain     Objective      Objective Measures updated at progress report unless specified.     Treatment     Arabella received the treatments listed below:      therapeutic exercises to develop strength and ROM for 38  minutes including:    Long sitting calf stretch 5x 30 sec  Long sitting soleus stretch 5x30 sec  Long sitting plantar fascia stretch with towel 5x30 sec  Big toes stretch 5x30 sec  Standing calf stretch 5x30 sec   Stnading soleus stretch 5x30 sec     manual therapy techniques: Soft tissue Mobilization were applied to the: left foot for 10 minutes, including:    Ice massage L plantar fascia  Soft Tissue Massage left plantar fascia     Pt cleared of contraindications and verbal and written consent acquired. Pt given option of copy of consent form. Pt educated on benefits and potential side effects of DN. DN for 5 minutes with pt in prone position with involved side L plantar fascia. Plantar  fasciitis  protocol with  winding.     Patient provided written and verbal consent to receive functional dry needling at today's visit (see consent form scanned into chart). FDN performed to L plantar fascia mm. FDN performed to reduce pain and muscle tension, promote blood flow, and improve ROM and function. Pt tolerated tx well without adverse effects. She was educated on what to expect following the procedure and she verbalized understanding.      Arabella participated in neuromuscular re-education activities to improve: Proprioception and muscle motor control for 12 minutes. The following activities were included:    Toe yoga RTB 3x10  Towel curls 2 min  FOOT gym green band 30x  Tandem stance on blue foam 5x30 sec     Arabella participated in dynamic functional therapeutic activities to improve functional performance for 00  minutes, including:        Patient Education and Home Exercises       Education provided:   - Anatomy and exam objective findings  - PT role and POC  - HEP     Written Home Exercises Provided: yes. Exercises were reviewed and Arabella was able to demonstrate them prior to the end of the session.  Arabella demonstrated good  understanding of the education provided. See EMR under Patient Instructions for exercises provided during therapy sessions.    Assessment     Pt presented to therapy with less pain of L plantar fascia pain and L lateral malleolus.  Addition of dry needling today. 1 needles was inserted L plantar fascia. needle was inserted shallow today. Pt did not have any side effects. PT session cont to focus in decrease L plantar fascia pain. Stretches techniques and muscles motor control of foot was performed. Pt required mod v/c's to perform exercises with proper form and proper muscles activation. STM and ice massage performed to L lateral ankle and L plantar fascia. Pain subsided in the end of PT session.       Arabella Is progressing well towards her goals.   Pt prognosis is Good.     Pt will  continue to benefit from skilled outpatient physical therapy to address the deficits listed in the problem list box on initial evaluation, provide pt/family education and to maximize pt's level of independence in the home and community environment.     Pt's spiritual, cultural and educational needs considered and pt agreeable to plan of care and goals.     Anticipated barriers to physical therapy: chronicity of pain     Goals:     SHORT TERM GOALS:  4 weeks     Patient will be independent with HEP to supplement therapy in return to maximal function.     Pain rating at Worst: 5/10 or less for improved tolerance with prolonged walking.      Patient will demonstrate Left ankle DF AROM at least 10 degrees.         LONG TERM GOALS: 8 weeks     Patient will demonstrate full Left ankle AROM in all directions.     Patient will demonstrate bilateral LE strength 5/5.      Pain Rating at Worst: 2/10 or less to improve overall Quality of Life.      Patient will meet predicted functional outcome (FOTO) score: 60% functional ability or greater to increase self-perceived functional ability.     Patient will be able to work out without increased foot symptoms. (Patient goal)     Patient will be independent with updated HEP at discharge for self-management of symptoms.          Plan     Continue with Physical Therapist Plan of Care.     Jaya Gutierrez, PT

## 2024-07-17 ENCOUNTER — CLINICAL SUPPORT (OUTPATIENT)
Dept: REHABILITATION | Facility: HOSPITAL | Age: 54
End: 2024-07-17
Payer: COMMERCIAL

## 2024-07-17 DIAGNOSIS — M72.2 PLANTAR FASCIITIS OF LEFT FOOT: Primary | ICD-10-CM

## 2024-07-17 PROCEDURE — 97140 MANUAL THERAPY 1/> REGIONS: CPT | Mod: PN

## 2024-07-17 PROCEDURE — 97112 NEUROMUSCULAR REEDUCATION: CPT | Mod: PN

## 2024-07-17 PROCEDURE — 97110 THERAPEUTIC EXERCISES: CPT | Mod: PN

## 2024-07-17 NOTE — PROGRESS NOTES
[facility]   Physical Therapy Treatment Note      Name: Arabella Jaimes  Clinic Number: [id number]    Therapy Diagnosis:   Encounter Diagnosis   Name Primary?    Plantar fasciitis of left foot Yes         Physician: Timur Rodríguez*    Visit Date: 7/17/2024    Physician Orders: PT Eval and Treat   Medical Diagnosis from Referral: M72.2 (ICD-10-CM) - Plantar fasciitis of left foot   Evaluation Date: 6/12/2024  Authorization Period Expiration: 12/31/2024  Plan of Care Expiration: 9/12/2024  Progress Note Due: 7/12/2024  Visit # / Visits authorized: 3/20  FOTO: 1/3     Precautions: Standard, HTN     PTA Visit #: 2/5     Time In: 1:00 pm  Time Out: 2:00 pm  Total Billable Time: 60 minutes    Subjective     Pt reports: that she felt increase pain and soreness after dry needling.     She was compliant with home exercise program.  Response to previous treatment: initial eval   Functional change: none     Pain: 4/10  Location: left foot pain     Objective      Objective Measures updated at progress report unless specified.     Treatment     Arabella received the treatments listed below:      therapeutic exercises to develop strength and ROM for 38  minutes including:    Long sitting calf stretch 5x 30 sec  Long sitting soleus stretch 5x30 sec  Long sitting plantar fascia stretch with towel 5x30 sec  Big toes stretch 5x30 sec  Standing calf stretch 5x30 sec   Standing soleus stretch 5x30 sec     manual therapy techniques: Soft tissue Mobilization were applied to the: left foot for 10 minutes, including:    Ice massage L plantar fascia  Soft Tissue Massage left plantar fascia     Vacuum/cupping STM with manual therapy techniques was performed to L heel  to decrease muscle tightness, increase circulation and promote healing process. The pt's skin was monitored for redness adjusting pressure as needed. The pt was instructed in possible side effects of bruising and/or soreness.       Arabella  participated in neuromuscular re-education activities to improve: Proprioception and muscle motor control for 12 minutes. The following activities were included:    Toe yoga RTB 3x10  Towel curls 2 min  FOOT gym green band 30x  Tandem stance on blue foam 5x30 sec     Arabella participated in dynamic functional therapeutic activities to improve functional performance for 00  minutes, including:        Patient Education and Home Exercises       Education provided:   - Anatomy and exam objective findings  - PT role and POC  - HEP     Written Home Exercises Provided: yes. Exercises were reviewed and Arabella was able to demonstrate them prior to the end of the session.  Arabella demonstrated good  understanding of the education provided. See EMR under Patient Instructions for exercises provided during therapy sessions.    Assessment     Pt presented to therapy with less pain of L plantar fascia pain and L lateral malleolus.  We performed cupping technique today. Pt did not have any side effects during cupping techinque today. Pt responded well to manual therapy. PT session cont to focus in decrease L plantar fascia pain. Stretches techniques and muscles motor control of foot was performed. Pt required mod v/c's to perform exercises with proper form and proper muscles activation. STM and ice massage performed to L lateral ankle and L plantar fascia. Pain subsided in the end of PT session.       Arabella Is progressing well towards her goals.   Pt prognosis is Good.     Pt will continue to benefit from skilled outpatient physical therapy to address the deficits listed in the problem list box on initial evaluation, provide pt/family education and to maximize pt's level of independence in the home and community environment.     Pt's spiritual, cultural and educational needs considered and pt agreeable to plan of care and goals.     Anticipated barriers to physical therapy: chronicity of pain     Goals:     SHORT TERM GOALS:  4 weeks      Patient will be independent with HEP to supplement therapy in return to maximal function.     Pain rating at Worst: 5/10 or less for improved tolerance with prolonged walking.      Patient will demonstrate Left ankle DF AROM at least 10 degrees.         LONG TERM GOALS: 8 weeks     Patient will demonstrate full Left ankle AROM in all directions.     Patient will demonstrate bilateral LE strength 5/5.      Pain Rating at Worst: 2/10 or less to improve overall Quality of Life.      Patient will meet predicted functional outcome (FOTO) score: 60% functional ability or greater to increase self-perceived functional ability.     Patient will be able to work out without increased foot symptoms. (Patient goal)     Patient will be independent with updated HEP at discharge for self-management of symptoms.          Plan     Continue with Physical Therapist Plan of Care.     Jaya Gutierrez PT

## 2024-07-22 ENCOUNTER — CLINICAL SUPPORT (OUTPATIENT)
Dept: REHABILITATION | Facility: HOSPITAL | Age: 54
End: 2024-07-22
Payer: COMMERCIAL

## 2024-07-22 DIAGNOSIS — M72.2 PLANTAR FASCIITIS OF LEFT FOOT: Primary | ICD-10-CM

## 2024-07-22 PROCEDURE — 97140 MANUAL THERAPY 1/> REGIONS: CPT | Mod: PN

## 2024-07-22 PROCEDURE — 97110 THERAPEUTIC EXERCISES: CPT | Mod: PN

## 2024-07-22 PROCEDURE — 97112 NEUROMUSCULAR REEDUCATION: CPT | Mod: PN

## 2024-07-22 NOTE — PROGRESS NOTES
[facility]   Physical Therapy Treatment Note      Name: Arabella Jaimes  Clinic Number: [id number]    Therapy Diagnosis:   Encounter Diagnosis   Name Primary?    Plantar fasciitis of left foot Yes           Physician: Timur Rodríguez*    Visit Date: 7/22/2024    Physician Orders: PT Eval and Treat   Medical Diagnosis from Referral: M72.2 (ICD-10-CM) - Plantar fasciitis of left foot   Evaluation Date: 6/12/2024  Authorization Period Expiration: 12/31/2024  Plan of Care Expiration: 9/12/2024  Progress Note Due: 8/22/2024  Visit # / Visits authorized: 8/20  FOTO: 1/3     Precautions: Standard, HTN     PTA Visit #: 2/5     Time In: 12:00 m  Time Out: 1:00 pm  Total Billable Time: 60 minutes    Subjective     Pt reports: that she felt better with cupping technique last visit. She states pain decrease for couple day. She states Friday she had more pain. She thinks the rain and weather changed cause increase in L heel pain. She states she also has some L anterior ankle pain. She has not been swimming due to rain.     She was compliant with home exercise program.  Response to previous treatment: initial eval   Functional change: none     Pain: 4/10  Location: left foot pain     Objective      Objective Measures updated at progress report unless specified.     L ankle DF: 10 deg    Treatment     Arabella received the treatments listed below:      therapeutic exercises to develop strength and ROM for 38  minutes including:    Long sitting calf stretch 5x 30 sec  Long sitting soleus stretch 5x30 sec  Long sitting plantar fascia stretch with towel 5x30 sec  Big toes stretch 5x30 sec  Standing calf stretch 5x30 sec   Standing soleus stretch 5x30 sec     manual therapy techniques: Soft tissue Mobilization were applied to the: left foot for 10 minutes, including:    Ice massage L plantar fascia  Soft Tissue Massage left plantar fascia     Vacuum/cupping STM with manual therapy techniques was performed  to L heel  to decrease muscle tightness, increase circulation and promote healing process. The pt's skin was monitored for redness adjusting pressure as needed. The pt was instructed in possible side effects of bruising and/or soreness.       Arabella participated in neuromuscular re-education activities to improve: Proprioception and muscle motor control for 12 minutes. The following activities were included:    Toe yoga RTB 3x10  Towel curls 2 min  FOOT gym green band 30x  Tandem stance on blue foam 5x30 sec     Arabella participated in dynamic functional therapeutic activities to improve functional performance for 00  minutes, including:        Patient Education and Home Exercises       Education provided:   - Anatomy and exam objective findings  - PT role and POC  - HEP     Written Home Exercises Provided: yes. Exercises were reviewed and Arabella was able to demonstrate them prior to the end of the session.  Arabella demonstrated good  understanding of the education provided. See EMR under Patient Instructions for exercises provided during therapy sessions.    Assessment     Pt presented to therapy with less pain of L plantar fascia pain and L lateral malleolus.  We cont with same exercises as last visit. We performed cupping technique today. Pt did not have any side effects during cupping techinque today. Pt responded well to manual therapy. PT session cont to focus in decrease L plantar fascia pain. Stretches techniques and muscles motor control of foot was performed. Pt required mod v/c's to perform exercises with proper form and proper muscles activation. STM and ice massage performed to L lateral ankle and L plantar fascia. Pain subsided in the end of PT session.  Pt was re-assessed today. Cont with L heel pain. Pt has improved L ankle DF AROM. Pt has met 2/3 STGs. Overall, she has been improving slowly.       Arabella Is progressing well towards her goals.   Pt prognosis is Good.     Pt will continue to benefit from  skilled outpatient physical therapy to address the deficits listed in the problem list box on initial evaluation, provide pt/family education and to maximize pt's level of independence in the home and community environment.     Pt's spiritual, cultural and educational needs considered and pt agreeable to plan of care and goals.     Anticipated barriers to physical therapy: chronicity of pain     Goals:     SHORT TERM GOALS:  4 weeks     Patient will be independent with HEP to supplement therapy in return to maximal function. Goal met 7-22-24    Pain rating at Worst: 5/10 or less for improved tolerance with prolonged walking.    Goal not met 7-22-24   Patient will demonstrate Left ankle DF AROM at least 10 degrees.  Goal met 7-22-24       LONG TERM GOALS: 8 weeks     Patient will demonstrate full Left ankle AROM in all directions.     Patient will demonstrate bilateral LE strength 5/5.      Pain Rating at Worst: 2/10 or less to improve overall Quality of Life.      Patient will meet predicted functional outcome (FOTO) score: 60% functional ability or greater to increase self-perceived functional ability.     Patient will be able to work out without increased foot symptoms. (Patient goal)     Patient will be independent with updated HEP at discharge for self-management of symptoms.          Plan     Continue with Physical Therapist Plan of Care.     Jaya Gutierrez, PT

## 2024-07-26 ENCOUNTER — CLINICAL SUPPORT (OUTPATIENT)
Dept: REHABILITATION | Facility: HOSPITAL | Age: 54
End: 2024-07-26
Payer: COMMERCIAL

## 2024-07-26 DIAGNOSIS — M72.2 PLANTAR FASCIITIS OF LEFT FOOT: Primary | ICD-10-CM

## 2024-07-26 PROCEDURE — 97110 THERAPEUTIC EXERCISES: CPT | Mod: PN

## 2024-07-26 PROCEDURE — 97112 NEUROMUSCULAR REEDUCATION: CPT | Mod: PN

## 2024-07-26 PROCEDURE — 97140 MANUAL THERAPY 1/> REGIONS: CPT | Mod: PN

## 2024-07-26 NOTE — PROGRESS NOTES
[facility]   Physical Therapy Treatment Note      Name: Arabella Jaimes  Clinic Number: [id number]    Therapy Diagnosis:   Encounter Diagnosis   Name Primary?    Plantar fasciitis of left foot Yes         Physician: Timur Rodríguez*    Visit Date: 7/26/2024    Physician Orders: PT Eval and Treat   Medical Diagnosis from Referral: M72.2 (ICD-10-CM) - Plantar fasciitis of left foot   Evaluation Date: 6/12/2024  Authorization Period Expiration: 12/31/2024  Plan of Care Expiration: 9/12/2024  Progress Note Due: 8/22/2024  Visit # / Visits authorized: 9/20  FOTO: 1/3     Precautions: Standard, HTN     PTA Visit #: 2/5     Time In: 1:00 m  Time Out: 2:00 pm  Total Billable Time: 60 minutes    Subjective     Pt reports: that she had pain this morning. She states she still have L heel pain. She states therapy has been helping decrease pain. Pain comes and goes.     She was compliant with home exercise program.  Response to previous treatment: initial eval   Functional change: none     Pain: 4/10  Location: left foot pain     Objective      Objective Measures updated at progress report unless specified.     L ankle DF: 10 deg    Treatment     Arabella received the treatments listed below:      therapeutic exercises to develop strength and ROM for 38  minutes including:    Long sitting plantar fascia stretch with towel 5x30 sec  Wall standing calf stretch 5x30 sec  Wall standing soleus stretch 5x30 sec  Big toes stretch 5x30 sec  Standing calf stretch incline  5x30 sec   Standing soleus stretch incline 5x30 sec     manual therapy techniques: Soft tissue Mobilization were applied to the: left foot for 10 minutes, including:    STM of L gastroc and soleus        Arabella participated in neuromuscular re-education activities to improve: Proprioception and muscle motor control for 12 minutes. The following activities were included:    Toe yoga RTB 3x10  Towel curls 2 min  FOOT gym green band 30x  Tandem  stance on blue foam 5x30 sec   Shuttle DL squat 1 red band 3x10   Shuttle heel raises 1 red band 3x10    Arabella participated in dynamic functional therapeutic activities to improve functional performance for 00  minutes, including:        Patient Education and Home Exercises       Education provided:   - Anatomy and exam objective findings  - PT role and POC  - HEP     Written Home Exercises Provided: yes. Exercises were reviewed and Arabella was able to demonstrate them prior to the end of the session.  Arabella demonstrated good  understanding of the education provided. See EMR under Patient Instructions for exercises provided during therapy sessions.    Assessment     Pt presented to therapy with more pain of L plantar fascia pain and  decrease L lateral malleolus. Addition of walk calf stretch, soleus stretch, shuttle heel raises and shuttle DL squat. Pt responded well to manual therapy. STM of L gastroc and soleus today with a lot tenderness and pain. PT session cont to focus in decrease L plantar fascia pain. Stretches techniques and muscles motor control of foot was performed. Pt required mod v/c's to perform exercises with proper form and proper muscles activation. Pain subsided in the end of PT session. Overall, she has been improving slowly.       Arabella Is progressing well towards her goals.   Pt prognosis is Good.     Pt will continue to benefit from skilled outpatient physical therapy to address the deficits listed in the problem list box on initial evaluation, provide pt/family education and to maximize pt's level of independence in the home and community environment.     Pt's spiritual, cultural and educational needs considered and pt agreeable to plan of care and goals.     Anticipated barriers to physical therapy: chronicity of pain     Goals:     SHORT TERM GOALS:  4 weeks     Patient will be independent with HEP to supplement therapy in return to maximal function. Goal met 7-22-24    Pain rating at Worst:  5/10 or less for improved tolerance with prolonged walking.    Goal not met 7-22-24   Patient will demonstrate Left ankle DF AROM at least 10 degrees.  Goal met 7-22-24       LONG TERM GOALS: 8 weeks     Patient will demonstrate full Left ankle AROM in all directions.     Patient will demonstrate bilateral LE strength 5/5.      Pain Rating at Worst: 2/10 or less to improve overall Quality of Life.      Patient will meet predicted functional outcome (FOTO) score: 60% functional ability or greater to increase self-perceived functional ability.     Patient will be able to work out without increased foot symptoms. (Patient goal)     Patient will be independent with updated HEP at discharge for self-management of symptoms.          Plan     Continue with Physical Therapist Plan of Care.     Jaya Gutierrez, PT

## 2024-07-29 ENCOUNTER — CLINICAL SUPPORT (OUTPATIENT)
Dept: REHABILITATION | Facility: HOSPITAL | Age: 54
End: 2024-07-29
Payer: COMMERCIAL

## 2024-07-29 DIAGNOSIS — M72.2 PLANTAR FASCIITIS OF LEFT FOOT: Primary | ICD-10-CM

## 2024-07-29 PROCEDURE — 97110 THERAPEUTIC EXERCISES: CPT | Mod: PN,CQ

## 2024-07-29 NOTE — PROGRESS NOTES
[facility]   Physical Therapy Treatment Note      Name: Arabella Jaimes  Clinic Number: [id number]    Therapy Diagnosis:   Encounter Diagnosis   Name Primary?    Plantar fasciitis of left foot Yes         Physician: Timur Rodríguez*    Visit Date: 7/29/2024    Physician Orders: PT Eval and Treat   Medical Diagnosis from Referral: M72.2 (ICD-10-CM) - Plantar fasciitis of left foot   Evaluation Date: 6/12/2024  Authorization Period Expiration: 12/31/2024  Plan of Care Expiration: 9/12/2024  Progress Note Due: 8/22/2024  Visit # / Visits authorized: 10/20  FOTO: 1/3     Precautions: Standard, HTN     PTA Visit #: 1/5     Time In: 14:07  Time Out: 15:00  Total Billable Time: 30 minutes with PTA    Subjective     Pt reports: her main issue is the heel pain. She would love to return to walking again because that's what she loves to do. She's currently doing swimming to off load pressure in her foot.     She was compliant with home exercise program.  Response to previous treatment: no concerns   Functional change: none     Pain: 4/10  Location: left foot pain     Objective      Objective Measures updated at progress report unless specified.     L ankle DF: 10 deg    Treatment     Arabella received the treatments listed below:      therapeutic exercises to develop strength and ROM for 23 minutes including:    Long sitting plantar fascia stretch with towel 5x30 sec  Wall standing calf stretch 5x30 sec  Wall standing soleus stretch 5x30 sec  Big toes stretch 5x30 sec  Standing calf stretch incline  5x30 sec   Standing soleus stretch incline 5x30 sec   +seated heel raises with 15lb kettle bell on thigh, x30     manual therapy techniques: Soft tissue Mobilization were applied to the: left foot for 10 minutes, including:    STM of L gastroc and soleus  Soft tissue massage left plantar surface and heel   Ice massage to left heel - 2 minutes       Arabella participated in neuromuscular re-education  activities to improve: Proprioception and muscle motor control for 20 minutes. The following activities were included:    Toe yoga RTB 3x10  Towel curls 2 min  FOOT gym green band 30x  Tandem stance on blue foam 5x30 sec   Shuttle DL squat 1 red band 3x10   Shuttle heel raises 1 red band 3x10    Arabella participated in dynamic functional therapeutic activities to improve functional performance for 00  minutes, including:  None performed this visit       Patient Education and Home Exercises       Education provided:   - Anatomy and exam objective findings  - PT role and POC  - HEP     Written Home Exercises Provided: yes. Exercises were reviewed and Arabella was able to demonstrate them prior to the end of the session.  Arabella demonstrated good  understanding of the education provided. See EMR under Patient Instructions for exercises provided during therapy sessions.    Assessment   Patient main pain is located at left calcaneus region. Continued with the same exercises as previous. Only added seated heel raises with kettlebell this visit which patient responded well. Ended session soft tissue massage to left gastroc and heel for decrease pain. Noted patient display very tight muscles with multiple little knots. Patient tolerates well with soft tissue massage. Overall, will progress as tolerated and work on pain reduction in order for patient to return to her full activities without problem.     Arabella Is progressing well towards her goals.   Pt prognosis is Good.     Pt will continue to benefit from skilled outpatient physical therapy to address the deficits listed in the problem list box on initial evaluation, provide pt/family education and to maximize pt's level of independence in the home and community environment.     Pt's spiritual, cultural and educational needs considered and pt agreeable to plan of care and goals.     Anticipated barriers to physical therapy: chronicity of pain     Goals:     SHORT TERM GOALS:  4  weeks     Patient will be independent with HEP to supplement therapy in return to maximal function. Goal met 7-22-24    Pain rating at Worst: 5/10 or less for improved tolerance with prolonged walking.    Goal not met 7-22-24   Patient will demonstrate Left ankle DF AROM at least 10 degrees.  Goal met 7-22-24       LONG TERM GOALS: 8 weeks     Patient will demonstrate full Left ankle AROM in all directions.     Patient will demonstrate bilateral LE strength 5/5.      Pain Rating at Worst: 2/10 or less to improve overall Quality of Life.      Patient will meet predicted functional outcome (FOTO) score: 60% functional ability or greater to increase self-perceived functional ability.     Patient will be able to work out without increased foot symptoms. (Patient goal)     Patient will be independent with updated HEP at discharge for self-management of symptoms.          Plan     Continue with Physical Therapist Plan of Care.     Reba Martinez, PTA   7/29/2024

## 2024-08-05 ENCOUNTER — CLINICAL SUPPORT (OUTPATIENT)
Dept: REHABILITATION | Facility: HOSPITAL | Age: 54
End: 2024-08-05
Payer: COMMERCIAL

## 2024-08-05 DIAGNOSIS — M72.2 PLANTAR FASCIITIS OF LEFT FOOT: Primary | ICD-10-CM

## 2024-08-05 PROCEDURE — 97112 NEUROMUSCULAR REEDUCATION: CPT | Mod: PN,CQ

## 2024-08-05 PROCEDURE — 97110 THERAPEUTIC EXERCISES: CPT | Mod: PN,CQ

## 2024-08-05 PROCEDURE — 97140 MANUAL THERAPY 1/> REGIONS: CPT | Mod: PN,CQ

## 2024-08-09 ENCOUNTER — CLINICAL SUPPORT (OUTPATIENT)
Dept: REHABILITATION | Facility: HOSPITAL | Age: 54
End: 2024-08-09
Payer: COMMERCIAL

## 2024-08-09 DIAGNOSIS — M72.2 PLANTAR FASCIITIS OF LEFT FOOT: Primary | ICD-10-CM

## 2024-08-09 PROCEDURE — 97110 THERAPEUTIC EXERCISES: CPT | Mod: PN

## 2024-08-09 PROCEDURE — 97112 NEUROMUSCULAR REEDUCATION: CPT | Mod: PN

## 2024-08-09 PROCEDURE — 97140 MANUAL THERAPY 1/> REGIONS: CPT | Mod: PN

## 2024-08-11 NOTE — PROGRESS NOTES
[facility]   Physical Therapy Treatment Note      Name: Arabella Jaimes  Clinic Number: [id number]    Therapy Diagnosis:   Encounter Diagnosis   Name Primary?    Plantar fasciitis of left foot Yes             Physician: Timur Rodríguez*    Visit Date: 8/12/2024    Physician Orders: PT Eval and Treat   Medical Diagnosis from Referral: M72.2 (ICD-10-CM) - Plantar fasciitis of left foot   Evaluation Date: 6/12/2024  Authorization Period Expiration: 12/31/2024  Plan of Care Expiration: 9/12/2024  Progress Note Due: 8/22/2024  Visit # / Visits authorized: 13/20  FOTO: 1/3     Precautions: Standard, HTN     PTA Visit #: 1/5     Time In: 3:00 pm  Time Out: 4:00 pm  Total Billable Time: 53 minutes     Subjective     Pt reports: she definitely thinks it's getting better.  The pain isn't in the arch of the foot anymore but mostly in the heel.   She was compliant with home exercise program.  Response to previous treatment: no concerns   Functional change: none     Pain: 2/10  Location: left heel     Objective      Objective Measures updated at progress report unless specified.         Treatment     Arabella received the treatments listed below:      therapeutic exercises to develop strength and ROM for 24 minutes including:    Long sitting plantar fascia stretch with towel 5x30 sec  Wall standing calf stretch 5x30 sec  Wall standing soleus stretch 5x30 sec  Big toes stretch 5x30 sec  Standing calf stretch incline  5x30 sec   Standing soleus stretch incline 5x30 sec   Seated heel raises with 15lb kettle bell on thigh, 3x12  Shuttle DL squat +3black band 3x10  +Shuttle SL squat 1 black band 3x10    Shuttle heel raises 2 black band 3x10       manual therapy techniques: Soft tissue Mobilization were applied to the: left foot for 9 minutes, including:  STM of L gastroc and soleus  Soft tissue massage left plantar surface and heel   Ice massage to left heel - 2 minutes       Arabella participated in  "neuromuscular re-education activities to improve: Proprioception and muscle motor control for 20 minutes. The following activities were included:  Toe yoga RTB 3x10  Towel curls 2 min  FOOT gym green band 30x  Tandem stance on blue foam 5x30 sec   Single leg stance on ground 4x 30" hold     Arabella participated in dynamic functional therapeutic activities to improve functional performance for 00  minutes, including:  None performed this visit       Patient Education and Home Exercises       Education provided:   - Anatomy and exam objective findings  - PT role and POC  - HEP     Written Home Exercises Provided: yes. Exercises were reviewed and Arabella was able to demonstrate them prior to the end of the session.  Arabella demonstrated good  understanding of the education provided. See EMR under Patient Instructions for exercises provided during therapy sessions.    Assessment   Arabella presented to PT today reporting some improvements with foot pain, specifically located near the left calcaneus.  Continued with exercises focusing on improved gastroc/post tib and right foot mm intrinsic/extrinsic strength. Introduced single leg shuttle press, as well as single leg stance for improved left ankle stability and strength.  Arabella was challenged with progressions but able to complete without reports of increased pain.  Continued with STM to lalitha gastroc, noting increased mm tension in the left gastroc, which decreased somewhat following manual therapy.  She was able to tolerate and completed the remainder of today's session without reports of increased left ankle pain or discomfort.     Overall, Arabella Is progressing well towards her goals.     Pt prognosis is Good.     Pt will continue to benefit from skilled outpatient physical therapy to address the deficits listed in the problem list box on initial evaluation, provide pt/family education and to maximize pt's level of independence in the home and community environment.     Pt's " spiritual, cultural and educational needs considered and pt agreeable to plan of care and goals.     Anticipated barriers to physical therapy: chronicity of pain     Goals:     SHORT TERM GOALS:  4 weeks     Patient will be independent with HEP to supplement therapy in return to maximal function. Goal met 7-22-24    Pain rating at Worst: 5/10 or less for improved tolerance with prolonged walking.    Goal not met 7-22-24   Patient will demonstrate Left ankle DF AROM at least 10 degrees.  Goal met 7-22-24       LONG TERM GOALS: 8 weeks     Patient will demonstrate full Left ankle AROM in all directions.     Patient will demonstrate bilateral LE strength 5/5.      Pain Rating at Worst: 2/10 or less to improve overall Quality of Life.      Patient will meet predicted functional outcome (FOTO) score: 60% functional ability or greater to increase self-perceived functional ability.     Patient will be able to work out without increased foot symptoms. (Patient goal)     Patient will be independent with updated HEP at discharge for self-management of symptoms.          Plan     Continue with Physical Therapist Plan of Care.     Alba Miguel, PTA   8/5/2024

## 2024-08-12 ENCOUNTER — CLINICAL SUPPORT (OUTPATIENT)
Dept: REHABILITATION | Facility: HOSPITAL | Age: 54
End: 2024-08-12
Payer: COMMERCIAL

## 2024-08-12 DIAGNOSIS — M72.2 PLANTAR FASCIITIS OF LEFT FOOT: Primary | ICD-10-CM

## 2024-08-12 PROCEDURE — 97112 NEUROMUSCULAR REEDUCATION: CPT | Mod: PN,CQ

## 2024-08-12 PROCEDURE — 97110 THERAPEUTIC EXERCISES: CPT | Mod: PN,CQ

## 2024-08-12 PROCEDURE — 97140 MANUAL THERAPY 1/> REGIONS: CPT | Mod: PN,CQ

## 2024-08-14 ENCOUNTER — CLINICAL SUPPORT (OUTPATIENT)
Dept: REHABILITATION | Facility: HOSPITAL | Age: 54
End: 2024-08-14
Payer: COMMERCIAL

## 2024-08-14 DIAGNOSIS — M72.2 PLANTAR FASCIITIS OF LEFT FOOT: Primary | ICD-10-CM

## 2024-08-14 PROCEDURE — 97140 MANUAL THERAPY 1/> REGIONS: CPT | Mod: PN

## 2024-08-14 PROCEDURE — 97110 THERAPEUTIC EXERCISES: CPT | Mod: PN

## 2024-08-14 PROCEDURE — 97112 NEUROMUSCULAR REEDUCATION: CPT | Mod: PN

## 2024-08-14 NOTE — PROGRESS NOTES
[facility]   Physical Therapy Treatment Note      Name: Arabella Jaimes  Clinic Number: [id number]    Therapy Diagnosis:   No diagnosis found.            Physician: Timur Rodríguez*    Visit Date: 8/14/2024    Physician Orders: PT Eval and Treat   Medical Diagnosis from Referral: M72.2 (ICD-10-CM) - Plantar fasciitis of left foot   Evaluation Date: 6/12/2024  Authorization Period Expiration: 12/31/2024  Plan of Care Expiration: 9/12/2024  Progress Note Due: 8/22/2024  Visit # / Visits authorized: 13/20  FOTO: 1/3     Precautions: Standard, HTN     PTA Visit #: 1/5     Time In: 3:00 pm  Time Out: 4:00 pm  Total Billable Time: 53 minutes     Subjective     Pt reports: she definitely thinks it's getting better.  The pain isn't in the arch of the foot anymore but mostly in the heel.   She was compliant with home exercise program.  Response to previous treatment: no concerns   Functional change: none     Pain: 2/10  Location: left heel     Objective      Objective Measures updated at progress report unless specified.         Treatment     Aarbella received the treatments listed below:      therapeutic exercises to develop strength and ROM for 24 minutes including:    Long sitting plantar fascia stretch with towel 5x30 sec  Wall standing calf stretch 5x30 sec  Wall standing soleus stretch 5x30 sec  Big toes stretch 5x30 sec  Standing calf stretch incline  5x30 sec   Standing soleus stretch incline 5x30 sec   Seated heel raises with 15lb kettle bell on thigh, 3x12  Shuttle DL squat +3black band 3x10  +Shuttle SL squat 1 black band 3x10    Shuttle heel raises 2 black band 3x10       manual therapy techniques: Soft tissue Mobilization were applied to the: left foot for 9 minutes, including:  STM of L gastroc and soleus  Soft tissue massage left plantar surface and heel   Ice massage to left heel - 2 minutes       Arabella participated in neuromuscular re-education activities to improve:  "Proprioception and muscle motor control for 20 minutes. The following activities were included:  Toe yoga RTB 3x10  Towel curls 2 min  FOOT gym green band 30x  Tandem stance on blue foam 5x30 sec   Single leg stance on ground 4x 30" hold     Arabella participated in dynamic functional therapeutic activities to improve functional performance for 00  minutes, including:  None performed this visit       Patient Education and Home Exercises       Education provided:   - Anatomy and exam objective findings  - PT role and POC  - HEP     Written Home Exercises Provided: yes. Exercises were reviewed and Arabella was able to demonstrate them prior to the end of the session.  Arabella demonstrated good  understanding of the education provided. See EMR under Patient Instructions for exercises provided during therapy sessions.    Assessment   Arabella presented to PT today reporting some improvements with foot pain, specifically located near the left calcaneus.  Continued with exercises focusing on improved gastroc/post tib and right foot mm intrinsic/extrinsic strength. Introduced single leg shuttle press, as well as single leg stance for improved left ankle stability and strength.  Arabella was challenged with progressions but able to complete without reports of increased pain.  Continued with STM to lalitha gastroc, noting increased mm tension in the left gastroc, which decreased somewhat following manual therapy.  She was able to tolerate and completed the remainder of today's session without reports of increased left ankle pain or discomfort.     Overall, Arabella Is progressing well towards her goals.     Pt prognosis is Good.     Pt will continue to benefit from skilled outpatient physical therapy to address the deficits listed in the problem list box on initial evaluation, provide pt/family education and to maximize pt's level of independence in the home and community environment.     Pt's spiritual, cultural and educational needs considered " and pt agreeable to plan of care and goals.     Anticipated barriers to physical therapy: chronicity of pain     Goals:     SHORT TERM GOALS:  4 weeks     Patient will be independent with HEP to supplement therapy in return to maximal function. Goal met 7-22-24    Pain rating at Worst: 5/10 or less for improved tolerance with prolonged walking.    Goal not met 7-22-24   Patient will demonstrate Left ankle DF AROM at least 10 degrees.  Goal met 7-22-24       LONG TERM GOALS: 8 weeks     Patient will demonstrate full Left ankle AROM in all directions.     Patient will demonstrate bilateral LE strength 5/5.      Pain Rating at Worst: 2/10 or less to improve overall Quality of Life.      Patient will meet predicted functional outcome (FOTO) score: 60% functional ability or greater to increase self-perceived functional ability.     Patient will be able to work out without increased foot symptoms. (Patient goal)     Patient will be independent with updated HEP at discharge for self-management of symptoms.          Plan     Continue with Physical Therapist Plan of Care.     Jaya Gutierrez, PT   8/5/2024

## 2024-08-14 NOTE — PROGRESS NOTES
[facility]   Physical Therapy Treatment Note      Name: Arabella Jaimes  Clinic Number: [id number]    Therapy Diagnosis:   Encounter Diagnosis   Name Primary?    Plantar fasciitis of left foot Yes     Physician: Timur Rodríguez*    Visit Date: 8/14/2024    Physician Orders: PT Eval and Treat   Medical Diagnosis from Referral: M72.2 (ICD-10-CM) - Plantar fasciitis of left foot   Evaluation Date: 6/12/2024  Authorization Period Expiration: 12/31/2024  Plan of Care Expiration: 9/12/2024  Progress Note Due: 8/22/2024  Visit # / Visits authorized: 13/20  FOTO: 1/3     Precautions: Standard, HTN     PTA Visit #: 1/5     Time In: 1207  Time Out: 1300  Total Billable Time: 53 minutes     Subjective     Pt reports: the heel continues to improve with therapy.   She was compliant with home exercise program.  Response to previous treatment: no concerns   Functional change: none     Pain: 2/10  Location: left heel     Objective      Objective Measures updated at progress report unless specified.     Treatment     Arabella received the treatments listed below:      therapeutic exercises to develop strength and ROM for 24 minutes including:    Long sitting plantar fascia stretch with towel 5x30 sec  Wall standing calf stretch 5x30 sec  Wall standing soleus stretch 5x30 sec  Big toes stretch 5x30 sec  Standing calf stretch incline  5x30 sec   Standing soleus stretch incline 5x30 sec   Seated heel raises with 15lb kettle bell on thigh, 3x12  Shuttle DL squat +3black band 3x10  +Shuttle SL squat 1 black band 3x10    Shuttle heel raises 2 black band 3x10       manual therapy techniques: Soft tissue Mobilization were applied to the: left foot for 9 minutes, including:  STM of L gastroc and soleus  Soft tissue massage left plantar surface and heel   Ice massage to left heel - 2 minutes       Arabella participated in neuromuscular re-education activities to improve: Proprioception and muscle motor control for 20  "minutes. The following activities were included:  Toe yoga RTB 3x10  Towel curls 2 min  FOOT gym green band 30x  Tandem stance on blue foam 5x30 sec   Single leg stance on ground 4x 30" hold     Arabella participated in dynamic functional therapeutic activities to improve functional performance for 00  minutes, including:  None performed this visit       Patient Education and Home Exercises       Education provided:   - Anatomy and exam objective findings  - PT role and POC  - HEP     Written Home Exercises Provided: yes. Exercises were reviewed and Arabella was able to demonstrate them prior to the end of the session.  Arabella demonstrated good  understanding of the education provided. See EMR under Patient Instructions for exercises provided during therapy sessions.    Assessment     Pt presents ambulating with an improved gait. Progressed exercises consisting of gastroc/soleus mobility and intrinsic strengthening. No c/o increased discomfort with prescribed activities.  Good response to exercise progression.    Overall, Arabella Is progressing well towards her goals.     Pt prognosis is Good.     Pt will continue to benefit from skilled outpatient physical therapy to address the deficits listed in the problem list box on initial evaluation, provide pt/family education and to maximize pt's level of independence in the home and community environment.     Pt's spiritual, cultural and educational needs considered and pt agreeable to plan of care and goals.     Anticipated barriers to physical therapy: chronicity of pain     Goals:     SHORT TERM GOALS:  4 weeks     Patient will be independent with HEP to supplement therapy in return to maximal function. Goal met 7-22-24    Pain rating at Worst: 5/10 or less for improved tolerance with prolonged walking.    Goal not met 7-22-24   Patient will demonstrate Left ankle DF AROM at least 10 degrees.  Goal met 7-22-24       LONG TERM GOALS: 8 weeks     Patient will demonstrate full " Left ankle AROM in all directions.     Patient will demonstrate bilateral LE strength 5/5.      Pain Rating at Worst: 2/10 or less to improve overall Quality of Life.      Patient will meet predicted functional outcome (FOTO) score: 60% functional ability or greater to increase self-perceived functional ability.     Patient will be able to work out without increased foot symptoms. (Patient goal)     Patient will be independent with updated HEP at discharge for self-management of symptoms.          Plan     Continue with Physical Therapist Plan of Care.     Brennan Ayon, PT   8/5/2024

## 2024-08-19 ENCOUNTER — CLINICAL SUPPORT (OUTPATIENT)
Dept: REHABILITATION | Facility: HOSPITAL | Age: 54
End: 2024-08-19
Payer: COMMERCIAL

## 2024-08-19 DIAGNOSIS — M72.2 PLANTAR FASCIITIS OF LEFT FOOT: Primary | ICD-10-CM

## 2024-08-19 PROCEDURE — 97112 NEUROMUSCULAR REEDUCATION: CPT | Mod: PN

## 2024-08-19 PROCEDURE — 97110 THERAPEUTIC EXERCISES: CPT | Mod: PN

## 2024-08-19 PROCEDURE — 97140 MANUAL THERAPY 1/> REGIONS: CPT | Mod: PN

## 2024-08-19 NOTE — PROGRESS NOTES
[facility]   Physical Therapy Treatment Note      Name: Arabella Jaimes  Clinic Number: [id number]    Therapy Diagnosis:   Encounter Diagnosis   Name Primary?    Plantar fasciitis of left foot Yes       Physician: Timur Rodríguez*    Visit Date: 8/19/2024    Physician Orders: PT Eval and Treat   Medical Diagnosis from Referral: M72.2 (ICD-10-CM) - Plantar fasciitis of left foot   Evaluation Date: 6/12/2024  Authorization Period Expiration: 12/31/2024  Plan of Care Expiration: 9/12/2024  Progress Note Due: 8/22/2024  Visit # / Visits authorized: 15/20  FOTO: 1/3     Precautions: Standard, HTN     PTA Visit #: 1/5     Time In: 1:00 pm  Time Out: 1:53 pm  Total Billable Time: 53 minutes     Subjective     Pt reports: that she has been back to work. She states she has more L heel pain today. Pain is 7/10  She was compliant with home exercise program.  Response to previous treatment: no concerns   Functional change: none     Pain: 7/10  Location: left heel     Objective      Objective Measures updated at progress report unless specified.     Treatment     Arabella received the treatments listed below:      therapeutic exercises to develop strength and ROM for 24 minutes including:    Long sitting plantar fascia stretch with towel 5x30 sec  Wall standing calf stretch 5x30 sec  Wall standing soleus stretch 5x30 sec  Big toes stretch 5x30 sec  Standing calf stretch incline  5x30 sec   Standing soleus stretch incline 5x30 sec   Seated heel raises with 15lb kettle bell on thigh, 3x12  Shuttle DL squat +3black band 3x10  +Shuttle SL squat 1 black band 3x10    Shuttle heel raises 2 black band 3x10       manual therapy techniques: Soft tissue Mobilization were applied to the: left foot for 9 minutes, including:  STM of L gastroc and soleus  Soft tissue massage left plantar surface and heel   Ice massage to left heel - 2 minutes       Arabella participated in neuromuscular re-education activities to  "improve: Proprioception and muscle motor control for 20 minutes. The following activities were included:  Toe yoga RTB 3x10  Towel curls 2 min  FOOT gym green band 30x  Tandem stance on blue foam 5x30 sec   Single leg stance on ground 4x 30" hold     Arabella participated in dynamic functional therapeutic activities to improve functional performance for 00  minutes, including:  None performed this visit       Patient Education and Home Exercises       Education provided:   - Anatomy and exam objective findings  - PT role and POC  - HEP     Written Home Exercises Provided: yes. Exercises were reviewed and Arabella was able to demonstrate them prior to the end of the session.  Arabella demonstrated good  understanding of the education provided. See EMR under Patient Instructions for exercises provided during therapy sessions.    Assessment     Pt presents ambulating with an improved gait. Pt presented with increase in L heel pain. Progressed exercises consisting of gastroc/soleus mobility and intrinsic strengthening. No c/o increased discomfort with prescribed activities. Soft tissue was performed to L calf and plantar fascia. Increase pain and soft tissue restriction noted. Pain subsided in the end of PT session. Good response to exercise progression.    Overall, Arabella Is progressing well towards her goals.     Pt prognosis is Good.     Pt will continue to benefit from skilled outpatient physical therapy to address the deficits listed in the problem list box on initial evaluation, provide pt/family education and to maximize pt's level of independence in the home and community environment.     Pt's spiritual, cultural and educational needs considered and pt agreeable to plan of care and goals.     Anticipated barriers to physical therapy: chronicity of pain     Goals:     SHORT TERM GOALS:  4 weeks     Patient will be independent with HEP to supplement therapy in return to maximal function. Goal met 7-22-24    Pain rating at " Worst: 5/10 or less for improved tolerance with prolonged walking.    Goal not met 7-22-24   Patient will demonstrate Left ankle DF AROM at least 10 degrees.  Goal met 7-22-24       LONG TERM GOALS: 8 weeks     Patient will demonstrate full Left ankle AROM in all directions.     Patient will demonstrate bilateral LE strength 5/5.      Pain Rating at Worst: 2/10 or less to improve overall Quality of Life.      Patient will meet predicted functional outcome (FOTO) score: 60% functional ability or greater to increase self-perceived functional ability.     Patient will be able to work out without increased foot symptoms. (Patient goal)     Patient will be independent with updated HEP at discharge for self-management of symptoms.          Plan     Continue with Physical Therapist Plan of Care.     Jaya Gutierrez, PT   8/5/2024

## 2024-08-29 ENCOUNTER — CLINICAL SUPPORT (OUTPATIENT)
Dept: REHABILITATION | Facility: HOSPITAL | Age: 54
End: 2024-08-29
Payer: COMMERCIAL

## 2024-08-29 DIAGNOSIS — M72.2 PLANTAR FASCIITIS OF LEFT FOOT: Primary | ICD-10-CM

## 2024-08-29 PROCEDURE — 97140 MANUAL THERAPY 1/> REGIONS: CPT | Mod: PN

## 2024-08-29 PROCEDURE — 97112 NEUROMUSCULAR REEDUCATION: CPT | Mod: PN

## 2024-08-29 PROCEDURE — 97110 THERAPEUTIC EXERCISES: CPT | Mod: PN

## 2024-08-29 NOTE — PROGRESS NOTES
[facility]   Physical Therapy Treatment Note      Name: Arabella Jaimes  Clinic Number: [id number]    Therapy Diagnosis:   Encounter Diagnosis   Name Primary?    Plantar fasciitis of left foot Yes         Physician: Timur Rodríguez*    Visit Date: 8/29/2024    Physician Orders: PT Eval and Treat   Medical Diagnosis from Referral: M72.2 (ICD-10-CM) - Plantar fasciitis of left foot   Evaluation Date: 6/12/2024  Authorization Period Expiration: 12/31/2024  Plan of Care Expiration: 9/12/2024  Progress Note Due: 9/29/2024  Visit # / Visits authorized: 16/20  FOTO: 1/3     Precautions: Standard, HTN     PTA Visit #: 1/5     Time In: 1:00 pm  Time Out: 1:50 pm  Total Billable Time: 50 minutes     Subjective     Pt reports: that she still have L heel pain. Pain comes and goes. Pt states she still have pain first step in the morning. She states pain is not as severe. She states she feels about 40% improvement since start therapy. Pt states pain at worse is 6/10. She states pain L heel affects her to walk around to perform her job task. She states she will return to [facility] today and tomorrow.   She was compliant with home exercise program.  Response to previous treatment: no concerns   Functional change: none     Pain: 4/10  Location: left heel     Objective      Objective Measures updated at progress report unless specified.         ROM: Active Range of Motion (Passive Range of Motion)  Ankle Left Goals   Dorsiflexion 20  degrees minor pain 20 degrees   Plantarflexion 55 degrees no pain 50 degrees   Inversion 40 degrees no pain 35 degrees   Eversion 24 degrees no pain 25 degrees      MMT:  Right LE   Left LE   Goals   Knee extension: 5/5 Knee extension: 5/5 5/5   Knee flexion: 5/5 Knee flexion: 5/5 5/5   Hip flexion: 5/5 Hip flexion: 5/5 5/5   Hip extension:  5/5 Hip extension: 5/5 5/5   Hip abduction: 5/5 Hip abduction: 5/5 5/5      Ankle Left Right Goals   Dorsiflexion 5/5 5/5 5/5  "  Plantarflexion 5/5 5/5 5/5   Inversion 5/5 5/5 5/5   Eversion 5/5 5/5 5/5       Palpation, decrease calf tenderness and pain.     Treatment     Arabella received the treatments listed below:      therapeutic exercises to develop strength and ROM for 25 minutes including:    Long sitting plantar fascia stretch with towel 5x30 sec  Wall standing calf stretch 5x30 sec  Wall standing soleus stretch 5x30 sec  Big toes stretch 5x30 sec  Standing calf stretch incline  5x30 sec   Standing soleus stretch incline 5x30 sec   Seated heel raises with 15lb kettle bell on thigh, 3x12  Shuttle DL squat +3black band 3x10  +Shuttle SL squat 1 black band 3x10    Shuttle heel raises 2 black band 3x10       manual therapy techniques: Soft tissue Mobilization were applied to the: left foot for 10 minutes, including:  STM of L gastroc and soleus  Soft tissue massage left plantar surface and heel   Ice massage to left heel - 2 minutes       Arabella participated in neuromuscular re-education activities to improve: Proprioception and muscle motor control for 15 minutes. The following activities were included:  Toe yoga RTB 3x10  Towel curls 2 min  FOOT gym green band 30x  Tandem stance on blue foam 5x30 sec   Single leg stance on ground 4x 30" hold     Arabella participated in dynamic functional therapeutic activities to improve functional performance for 00  minutes, including:  None performed this visit       Patient Education and Home Exercises       Education provided:   - Anatomy and exam objective findings  - PT role and POC  - HEP     Written Home Exercises Provided: yes. Exercises were reviewed and Arabella was able to demonstrate them prior to the end of the session.  Arabella demonstrated good  understanding of the education provided. See EMR under Patient Instructions for exercises provided during therapy sessions.    Assessment     Pt was re-assessed today. Pt presents ambulating with an improved gait. Pt presented with mod in L heel pain. " Pt has less pain first step int he morning. Pt has improved L ankle AROM and L LE muscles strength. Pt has decrease gastroc and soleus tenderness and pain. FOTO outcome survey demonstrated improvement since initial eval. Pt has met 3/6 LTGs. Overall, pt has been improving in therapy. Plan to cont with POC.  Pt prognosis is Good.     Pt will continue to benefit from skilled outpatient physical therapy to address the deficits listed in the problem list box on initial evaluation, provide pt/family education and to maximize pt's level of independence in the home and community environment.     Pt's spiritual, cultural and educational needs considered and pt agreeable to plan of care and goals.     Anticipated barriers to physical therapy: chronicity of pain     Goals:     SHORT TERM GOALS:  4 weeks     Patient will be independent with HEP to supplement therapy in return to maximal function. Goal met 7-22-24    Pain rating at Worst: 5/10 or less for improved tolerance with prolonged walking.    Goal not met 7-22-24   Patient will demonstrate Left ankle DF AROM at least 10 degrees.  Goal met 7-22-24       LONG TERM GOALS: 8 weeks     Patient will demonstrate full Left ankle AROM in all directions. Goal met 8-29-24    Patient will demonstrate bilateral LE strength 5/5.   Goal met 8-29-24    Pain Rating at Worst: 2/10 or less to improve overall Quality of Life.   Goal not met 8-29-24    Patient will meet predicted functional outcome (FOTO) score: 60% functional ability or greater to increase self-perceived functional ability.  Goal not met 8-29-24    Patient will be able to work out without increased foot symptoms. (Patient goal)  Goal not met 8-29-24    Patient will be independent with updated HEP at discharge for self-management of symptoms.   Goal met 8-29-24        Plan     Plan with POC     Jaya Gutierrez, PT   08/29/2024

## 2024-08-30 ENCOUNTER — OFFICE VISIT (OUTPATIENT)
Dept: RHEUMATOLOGY | Facility: CLINIC | Age: 54
End: 2024-08-30
Payer: COMMERCIAL

## 2024-08-30 ENCOUNTER — LAB VISIT (OUTPATIENT)
Dept: LAB | Facility: HOSPITAL | Age: 54
End: 2024-08-30
Attending: INTERNAL MEDICINE
Payer: COMMERCIAL

## 2024-08-30 VITALS
DIASTOLIC BLOOD PRESSURE: 82 MMHG | SYSTOLIC BLOOD PRESSURE: 117 MMHG | BODY MASS INDEX: 33.05 KG/M2 | HEIGHT: 64 IN | HEART RATE: 86 BPM | WEIGHT: 193.56 LBS

## 2024-08-30 DIAGNOSIS — M79.671 FOOT PAIN, BILATERAL: ICD-10-CM

## 2024-08-30 DIAGNOSIS — R70.0 ESR RAISED: ICD-10-CM

## 2024-08-30 DIAGNOSIS — M72.2 PLANTAR FASCIITIS OF LEFT FOOT: ICD-10-CM

## 2024-08-30 DIAGNOSIS — M79.672 FOOT PAIN, BILATERAL: Primary | ICD-10-CM

## 2024-08-30 DIAGNOSIS — M79.672 FOOT PAIN, BILATERAL: ICD-10-CM

## 2024-08-30 DIAGNOSIS — M79.671 FOOT PAIN, BILATERAL: Primary | ICD-10-CM

## 2024-08-30 LAB
ALBUMIN SERPL BCP-MCNC: 3.6 G/DL (ref 3.5–5.2)
ALP SERPL-CCNC: 123 U/L (ref 55–135)
ALT SERPL W/O P-5'-P-CCNC: 25 U/L (ref 10–44)
ANION GAP SERPL CALC-SCNC: 10 MMOL/L (ref 8–16)
AST SERPL-CCNC: 19 U/L (ref 10–40)
BILIRUB SERPL-MCNC: 0.4 MG/DL (ref 0.1–1)
BUN SERPL-MCNC: 16 MG/DL (ref 6–20)
CALCIUM SERPL-MCNC: 9.7 MG/DL (ref 8.7–10.5)
CHLORIDE SERPL-SCNC: 107 MMOL/L (ref 95–110)
CO2 SERPL-SCNC: 23 MMOL/L (ref 23–29)
CREAT SERPL-MCNC: 0.7 MG/DL (ref 0.5–1.4)
EST. GFR  (NO RACE VARIABLE): >60 ML/MIN/1.73 M^2
GLUCOSE SERPL-MCNC: 89 MG/DL (ref 70–110)
POTASSIUM SERPL-SCNC: 4.4 MMOL/L (ref 3.5–5.1)
PROT SERPL-MCNC: 7.3 G/DL (ref 6–8.4)
SODIUM SERPL-SCNC: 140 MMOL/L (ref 136–145)

## 2024-08-30 PROCEDURE — 36415 COLL VENOUS BLD VENIPUNCTURE: CPT | Performed by: INTERNAL MEDICINE

## 2024-08-30 PROCEDURE — 80053 COMPREHEN METABOLIC PANEL: CPT | Performed by: INTERNAL MEDICINE

## 2024-08-30 PROCEDURE — 99999 PR PBB SHADOW E&M-EST. PATIENT-LVL IV: CPT | Mod: PBBFAC,,, | Performed by: INTERNAL MEDICINE

## 2024-08-30 RX ORDER — ROSUVASTATIN CALCIUM 10 MG/1
1 TABLET, COATED ORAL DAILY
COMMUNITY
Start: 2024-07-24 | End: 2025-07-24

## 2024-08-30 NOTE — PROGRESS NOTES
Chief Complaint   Patient presents with    Disease Management       History of presenting illness    54 year old female with polyarticular joint pain for follow up    Past history  Hypertension  HLD    Family history  Dad has arthritis- crooked and crackly arthritis with pain  Mom has alzheimers,arthritis  Sister- lymphoma  Brother- gout    Social history  Not a smoker,alcoholic        Review of Systems        No telangiectasias   No calcinosis   No psoriasis     No patchy alopecia   No oral and nasal ulcers   No dry eyes and dry mouth   No pleurisy or any cardiopulmonary complaints   No dysphagia,diplopia and dysphonia and muscle weakness   No n/v/d/c   No acid reflux+   No raynaud's+   No digital ulcers   No cytopenias   No renal issues   No blood clots   No fever,chills,weight loss and loss of appetite   No pregnancy losses/pre term deliveries /pregnancy complications   No recurrent conjunctivitis or uveitis or scleritis or episcleritis   No chronic or bloody diarrhea with no u colitis or crohn's /inflammatory bowel disease   No vaginal or urethral  d/c/STDs/no ulcers   No unexplained lymphadenopathy,parotitis   No seizures,strokes,psychosis  No sclerodactyly  No puffy hands  No perioral tightness       There is currently no information documented on the homunculus. Go to the Rheumatology activity and complete the homunculus joint exam.    Physical Exam   Constitutional: She is oriented to person, place, and time. No distress.   HENT:   Head: Normocephalic.   Mouth/Throat: Oropharynx is clear and moist.   Eyes: Pupils are equal, round, and reactive to light. Conjunctivae are normal. Right eye exhibits no discharge. Left eye exhibits no discharge. No scleral icterus.   Neck: No thyromegaly present.   Cardiovascular: Normal rate, regular rhythm and normal heart sounds.   Pulmonary/Chest: Effort normal and breath sounds normal. No stridor.   Abdominal: Soft. Bowel sounds are normal.   Musculoskeletal:          General: Normal range of motion.      Cervical back: Normal range of motion.   Lymphadenopathy:     She has no cervical adenopathy.   Neurological: She is alert and oriented to person, place, and time.   Skin: Skin is warm. No rash noted. She is not diaphoretic.   Psychiatric: Affect and judgment normal.         Assessment     54 year old white female comes in with joint pains for 10 months    She has     1 ) One episode of b/l feet pain  No triggers, but she walked 5 miles a day, she also had changed shoes and she felt like this might have triggered it  No A/R factors  On top of the feet  Swelling +  No stiffness   She had tingling and numbness in the feet  This was 10 months ago  She stopped exercising   It lasted for 4 months and then it resolved by easing back on walking and exercising    Xrays normal  Blood work ?? Arthritis  MIGUEL neg  Uric acid nml  CMP nml  Vit d nml    Inflammation was ? High hence see rheumatology- this was placed 10 months ago  Medication to alleviate pain was offered    This has not returned  She has continued to be on the lighter exercise regimen    2) new onset pain in the back of the left heel- not necessarily in the achilles- this started few months ago  When she gets up, it is hard to walk  First few steps in the morning- painful- then gets better and then by the end of the day it can get worse again  Easier to walk during the day  But pain persists throughout the day    Light aches in the ankles,comes and goes,no known triggers    2 ) Pain in the b/l hands at the [facility] only  CMC injections at ortho -in the past and braces helped    No pain,swelling in the MCPs,PIPs,DIPs    Wrists hurt- not from activity   No swelling  Tingling and numbness     Remotely had wrist pain only during pregnancy,last trimester,possibly due to CTS     3) During menopause she had pain in the hips     4) tingling and numbness face and mouth area 10 months ago  This has not recurred      Associated  symptoms  Rash behind the ear- could be due to allergies ??   Rash on the palms of the hands  She was never evaluated by dermatology     Scratch test positive to dogs  H/o eczema on knees    Heat and sun exposure can cause rash on the thighs and on the face     Blurry vision when tired or in the mornings   Eye exam nml  Needs reader glasses    Migraines all her life,with blind spots and floaters needing ibuprofen and avoiding lights and needing rest    Night sweats-menopausal    On exam today  0 TJ,SJ  Other than active plantar fasciitis none of the exam findings are active currently    Labs     ESR 44  Neg RF,CCP,CRP,HLAB27    Hand xrays normal  Wrist xrays normal  Foot and ankle xrays  Mild spurring is present at the plantar fascial attachments upon the calcaneus bilaterally.  Minimal degenerative changes at the 1st MTP joints bilaterally.  Joint spaces and alignment otherwise appear maintained.    Physical therapy has been helping her     She has seen orthopedics for the foot issues who also didn't see anything on the foot xrays except bone spurs on the calcaneal region    Topical voltaren gel doesn't help    Today her main concerns are    Left plantar fasciitis pain which disables her from walking.Mornings she cannot walk on it,she needs stretches to feel better  Ice massages bring her relief    Right forefoot pain is constant- intensity fluctuates- it feels like tendinitis and CTS- It is not pressure pain,it is an aching pain. Pain is present whether she is moving or not.No swelling on top of the foot, no stiffness    Fingers -harder to make a   Washing dishes is hard    Stiffness in the hips when she wakes up -lasts for 5 minutes to max 10 minutes    No pain in the hands,no pain in the hips  So didn't think this is PMR    No pain,swelling and stiffness in joints other than the foot issues I mentioned above    1. Foot pain, bilateral    2. Plantar fasciitis of left foot    3. ESR raised          Reviewed  labs/xrays  Reviewed medications    Ordered labs /xrays      F/u problem     Plan    CMP today    MRI right forefoot with and without contrast    MRI left hindfoot with and without contrast    Physical therapy for plantar fasciitis  Home PT    Foot orthopedics    Rtc 3 months         Arabella was seen today for disease management.    Diagnoses and all orders for this visit:    Foot pain, bilateral  -     MRI Foot (Hindfoot) Left W W/O Contrast; Future  -     MRI Foot (Forefoot) Right W W/O Contrast; Future  -     Ambulatory referral/consult to Orthopedics; Future  -     Comprehensive Metabolic Panel; Future    Plantar fasciitis of left foot  -     Ambulatory referral/consult to Orthopedics; Future  -     Comprehensive Metabolic Panel; Future    ESR raised  -     Ambulatory referral/consult to Orthopedics; Future          Medication changes made  Refilled medications  Toxicity issues discussed    Old records reviewed and summarised  Records are from       Follow up in     Notes will be sent to PCP    Preventive medicine